# Patient Record
Sex: FEMALE | Race: WHITE
[De-identification: names, ages, dates, MRNs, and addresses within clinical notes are randomized per-mention and may not be internally consistent; named-entity substitution may affect disease eponyms.]

---

## 2017-02-06 ENCOUNTER — MYAURORA ACCOUNT LINK (OUTPATIENT)
Dept: OTHER | Age: 82
End: 2017-02-06

## 2017-02-06 ENCOUNTER — PRIOR ORIGINAL RECORDS (OUTPATIENT)
Dept: OTHER | Age: 82
End: 2017-02-06

## 2017-03-02 ENCOUNTER — HOSPITAL ENCOUNTER (INPATIENT)
Facility: HOSPITAL | Age: 82
LOS: 4 days | Discharge: NURSING, REHAB OR LONG-TERM CARE | DRG: 439 | End: 2017-03-06
Attending: EMERGENCY MEDICINE | Admitting: HOSPITALIST
Payer: MEDICARE

## 2017-03-02 ENCOUNTER — APPOINTMENT (OUTPATIENT)
Dept: CT IMAGING | Facility: HOSPITAL | Age: 82
DRG: 439 | End: 2017-03-02
Attending: EMERGENCY MEDICINE
Payer: MEDICARE

## 2017-03-02 ENCOUNTER — APPOINTMENT (OUTPATIENT)
Dept: INTERVENTIONAL RADIOLOGY/VASCULAR | Facility: HOSPITAL | Age: 82
DRG: 439 | End: 2017-03-02
Attending: PHYSICIAN ASSISTANT
Payer: MEDICARE

## 2017-03-02 ENCOUNTER — APPOINTMENT (OUTPATIENT)
Dept: GENERAL RADIOLOGY | Facility: HOSPITAL | Age: 82
DRG: 439 | End: 2017-03-02
Attending: EMERGENCY MEDICINE
Payer: MEDICARE

## 2017-03-02 DIAGNOSIS — K85.90 ACUTE PANCREATITIS, UNSPECIFIED COMPLICATION STATUS, UNSPECIFIED PANCREATITIS TYPE: Primary | ICD-10-CM

## 2017-03-02 DIAGNOSIS — K81.0 ACUTE CHOLECYSTITIS: ICD-10-CM

## 2017-03-02 PROBLEM — Z91.81 AT RISK FOR FALLING: Status: ACTIVE | Noted: 2017-03-02

## 2017-03-02 LAB
ALBUMIN SERPL-MCNC: 3.3 G/DL (ref 3.5–4.8)
ALP LIVER SERPL-CCNC: 50 U/L (ref 55–142)
ALT SERPL-CCNC: 58 U/L (ref 14–54)
APTT PPP: 30.4 SECONDS (ref 25–34)
AST SERPL-CCNC: 103 U/L (ref 15–41)
ATRIAL RATE: 60 BPM
BASOPHILS # BLD AUTO: 0.02 X10(3) UL (ref 0–0.1)
BASOPHILS NFR BLD AUTO: 0.2 %
BILIRUB SERPL-MCNC: 0.8 MG/DL (ref 0.1–2)
BUN BLD-MCNC: 13 MG/DL (ref 8–20)
CALCIUM BLD-MCNC: 9.3 MG/DL (ref 8.3–10.3)
CHLORIDE: 100 MMOL/L (ref 101–111)
CO2: 25 MMOL/L (ref 22–32)
CREAT BLD-MCNC: 0.58 MG/DL (ref 0.55–1.02)
EOSINOPHIL # BLD AUTO: 0.03 X10(3) UL (ref 0–0.3)
EOSINOPHIL NFR BLD AUTO: 0.2 %
ERYTHROCYTE [DISTWIDTH] IN BLOOD BY AUTOMATED COUNT: 13.9 % (ref 11.5–16)
GLUCOSE BLD-MCNC: 125 MG/DL (ref 70–99)
HCT VFR BLD AUTO: 35.5 % (ref 34–50)
HGB BLD-MCNC: 11.9 G/DL (ref 12–16)
IMMATURE GRANULOCYTE COUNT: 0.05 X10(3) UL (ref 0–1)
IMMATURE GRANULOCYTE RATIO %: 0.4 %
INR BLD: 1.04 (ref 0.89–1.11)
LIPASE: >7500 U/L (ref 73–393)
LYMPHOCYTES # BLD AUTO: 1.03 X10(3) UL (ref 0.9–4)
LYMPHOCYTES NFR BLD AUTO: 7.9 %
M PROTEIN MFR SERPL ELPH: 5.7 G/DL (ref 6.1–8.3)
MCH RBC QN AUTO: 30.5 PG (ref 27–33.2)
MCHC RBC AUTO-ENTMCNC: 33.5 G/DL (ref 31–37)
MCV RBC AUTO: 91 FL (ref 81–100)
MONOCYTES # BLD AUTO: 0.59 X10(3) UL (ref 0.1–0.6)
MONOCYTES NFR BLD AUTO: 4.6 %
NEUTROPHIL ABS PRELIM: 11.24 X10 (3) UL (ref 1.3–6.7)
NEUTROPHILS # BLD AUTO: 11.24 X10(3) UL (ref 1.3–6.7)
NEUTROPHILS NFR BLD AUTO: 86.7 %
P AXIS: 27 DEGREES
P-R INTERVAL: 204 MS
PLATELET # BLD AUTO: 195 10(3)UL (ref 150–450)
POTASSIUM SERPL-SCNC: 3.6 MMOL/L (ref 3.6–5.1)
PRO-BETA NATRIURETIC PEPTIDE: 444 PG/ML (ref ?–450)
PSA SERPL DL<=0.01 NG/ML-MCNC: 13.6 SECONDS (ref 12–14.3)
Q-T INTERVAL: 442 MS
QRS DURATION: 94 MS
QTC CALCULATION (BEZET): 442 MS
R AXIS: -27 DEGREES
RBC # BLD AUTO: 3.9 X10(6)UL (ref 3.8–5.1)
RED CELL DISTRIBUTION WIDTH-SD: 46.4 FL (ref 35.1–46.3)
SODIUM SERPL-SCNC: 135 MMOL/L (ref 136–144)
T AXIS: -8 DEGREES
TROPONIN: <0.046 NG/ML (ref ?–0.05)
VENTRICULAR RATE: 60 BPM
WBC # BLD AUTO: 13 X10(3) UL (ref 4–13)

## 2017-03-02 PROCEDURE — 71275 CT ANGIOGRAPHY CHEST: CPT

## 2017-03-02 PROCEDURE — 99223 1ST HOSP IP/OBS HIGH 75: CPT | Performed by: SURGERY

## 2017-03-02 PROCEDURE — BF131ZZ FLUOROSCOPY OF GALLBLADDER AND BILE DUCTS USING LOW OSMOLAR CONTRAST: ICD-10-PCS | Performed by: RADIOLOGY

## 2017-03-02 PROCEDURE — 0F9430Z DRAINAGE OF GALLBLADDER WITH DRAINAGE DEVICE, PERCUTANEOUS APPROACH: ICD-10-PCS | Performed by: RADIOLOGY

## 2017-03-02 PROCEDURE — 71010 XR CHEST AP PORTABLE  (CPT=71010): CPT

## 2017-03-02 PROCEDURE — 74177 CT ABD & PELVIS W/CONTRAST: CPT

## 2017-03-02 PROCEDURE — 99223 1ST HOSP IP/OBS HIGH 75: CPT | Performed by: INTERNAL MEDICINE

## 2017-03-02 RX ORDER — HYDROMORPHONE HYDROCHLORIDE 1 MG/ML
0.8 INJECTION, SOLUTION INTRAMUSCULAR; INTRAVENOUS; SUBCUTANEOUS EVERY 2 HOUR PRN
Status: DISCONTINUED | OUTPATIENT
Start: 2017-03-02 | End: 2017-03-06

## 2017-03-02 RX ORDER — HYDROMORPHONE HYDROCHLORIDE 1 MG/ML
0.2 INJECTION, SOLUTION INTRAMUSCULAR; INTRAVENOUS; SUBCUTANEOUS EVERY 2 HOUR PRN
Status: DISCONTINUED | OUTPATIENT
Start: 2017-03-02 | End: 2017-03-06

## 2017-03-02 RX ORDER — HYDROMORPHONE HYDROCHLORIDE 1 MG/ML
0.4 INJECTION, SOLUTION INTRAMUSCULAR; INTRAVENOUS; SUBCUTANEOUS EVERY 2 HOUR PRN
Status: DISCONTINUED | OUTPATIENT
Start: 2017-03-02 | End: 2017-03-06

## 2017-03-02 RX ORDER — ASPIRIN 81 MG/1
324 TABLET, CHEWABLE ORAL ONCE
Status: COMPLETED | OUTPATIENT
Start: 2017-03-02 | End: 2017-03-02

## 2017-03-02 RX ORDER — LIDOCAINE HYDROCHLORIDE 10 MG/ML
INJECTION, SOLUTION INFILTRATION; PERINEURAL
Status: COMPLETED
Start: 2017-03-02 | End: 2017-03-02

## 2017-03-02 RX ORDER — SODIUM CHLORIDE 9 MG/ML
INJECTION, SOLUTION INTRAVENOUS ONCE
Status: COMPLETED | OUTPATIENT
Start: 2017-03-02 | End: 2017-03-02

## 2017-03-02 RX ORDER — ONDANSETRON 2 MG/ML
4 INJECTION INTRAMUSCULAR; INTRAVENOUS EVERY 6 HOURS PRN
Status: DISCONTINUED | OUTPATIENT
Start: 2017-03-02 | End: 2017-03-06

## 2017-03-02 RX ORDER — ONDANSETRON 2 MG/ML
4 INJECTION INTRAMUSCULAR; INTRAVENOUS EVERY 4 HOURS PRN
Status: DISCONTINUED | OUTPATIENT
Start: 2017-03-02 | End: 2017-03-02 | Stop reason: ALTCHOICE

## 2017-03-02 RX ORDER — ENOXAPARIN SODIUM 100 MG/ML
40 INJECTION SUBCUTANEOUS DAILY
Status: DISCONTINUED | OUTPATIENT
Start: 2017-03-02 | End: 2017-03-03 | Stop reason: DRUGHIGH

## 2017-03-02 RX ORDER — HYDRALAZINE HYDROCHLORIDE 20 MG/ML
5 INJECTION INTRAMUSCULAR; INTRAVENOUS EVERY 6 HOURS PRN
Status: DISCONTINUED | OUTPATIENT
Start: 2017-03-02 | End: 2017-03-06

## 2017-03-02 RX ORDER — TRAMADOL HYDROCHLORIDE 50 MG/1
50 TABLET ORAL 3 TIMES DAILY
Status: ON HOLD | COMMUNITY
End: 2017-03-06

## 2017-03-02 RX ORDER — MAGNESIUM HYDROXIDE/ALUMINUM HYDROXICE/SIMETHICONE 120; 1200; 1200 MG/30ML; MG/30ML; MG/30ML
30 SUSPENSION ORAL ONCE
Status: COMPLETED | OUTPATIENT
Start: 2017-03-02 | End: 2017-03-02

## 2017-03-02 RX ORDER — SODIUM CHLORIDE, SODIUM LACTATE, POTASSIUM CHLORIDE, CALCIUM CHLORIDE 600; 310; 30; 20 MG/100ML; MG/100ML; MG/100ML; MG/100ML
INJECTION, SOLUTION INTRAVENOUS CONTINUOUS
Status: DISCONTINUED | OUTPATIENT
Start: 2017-03-02 | End: 2017-03-03

## 2017-03-02 RX ORDER — ONDANSETRON 2 MG/ML
4 INJECTION INTRAMUSCULAR; INTRAVENOUS ONCE
Status: COMPLETED | OUTPATIENT
Start: 2017-03-02 | End: 2017-03-02

## 2017-03-02 RX ORDER — HYDROMORPHONE HYDROCHLORIDE 1 MG/ML
0.5 INJECTION, SOLUTION INTRAMUSCULAR; INTRAVENOUS; SUBCUTANEOUS EVERY 30 MIN PRN
Status: ACTIVE | OUTPATIENT
Start: 2017-03-02 | End: 2017-03-02

## 2017-03-02 RX ORDER — SODIUM CHLORIDE 9 MG/ML
INJECTION, SOLUTION INTRAVENOUS CONTINUOUS
Status: DISCONTINUED | OUTPATIENT
Start: 2017-03-02 | End: 2017-03-02

## 2017-03-02 RX ORDER — MIDAZOLAM HYDROCHLORIDE 1 MG/ML
INJECTION INTRAMUSCULAR; INTRAVENOUS
Status: COMPLETED
Start: 2017-03-02 | End: 2017-03-02

## 2017-03-02 RX ORDER — SODIUM CHLORIDE 9 MG/ML
INJECTION, SOLUTION INTRAVENOUS CONTINUOUS
Status: ACTIVE | OUTPATIENT
Start: 2017-03-02 | End: 2017-03-02

## 2017-03-02 NOTE — ED INITIAL ASSESSMENT (HPI)
Pt states she woke up from shoulder pain (d/t arthritis) at 0200 this morning. Pt states she started having chest pain that radiated to her back. Pt also reports feeling nauseous.

## 2017-03-02 NOTE — PROCEDURES
[facility]  Procedure Note    Luz Maria Dong Patient Status:  Inpatient    1920 MRN [id number]   [facility] 5NW-A Attending Venita Patel MD   Hosp Day # 0 PCP Rivka Hemphill MD     Procedure: cholecystostomy    Pre-Procedure Diag

## 2017-03-02 NOTE — PAYOR COMM NOTE
Attending Physician: bAilio John MD    Review Type: ADMISSION   Reviewer: Carolyn WYLIE       Date: March 2, 2017 - 10:59 AM  Payor: Gaby Herrera MEDICARE ADV PPO  Authorization Number: [id number]/A  Admit date: 3/2/2017  6:03 AM   Admitted from Emergency Dept.:  Alexey Payton User    3/2/2017 1047 Given 4 mg Intravenous Allison Schaffer RN      ondansetron HCl [facility]) injection 4 mg     Date Action Dose Route User    3/2/2017 0928 Given 4 mg Intravenous Gio Conklin RN      Piperacillin Sod-Tazobactam So (ZOSYN) 3.375 g i Impression:  Acute pancreatitis,   Acute cholecystitis    NPO  CARDIAC MONITORING   GI AND SURG CONSULT    ML HR

## 2017-03-02 NOTE — CONSULTS
[facility]                       [facility]enter[facility] Consultation-[facility]    Madhuri Brooks Patient Status:  Inpatient    1920 MRN [id number]   [facility] 5NW-A Attending Vahid Mittal MD   Hosp Day # 0 PCP APPENDECTOMY      TONSILLECTOMY          Medications:   [COMPLETED] aspirin chewable tab 324 mg 324 mg Oral Once   [COMPLETED] Alum & Mag Hydroxide-Simeth (MAALOX) oral suspension 30 mL 30 mL Oral Once   [COMPLETED] ondansetron HCl (ZOFRAN) injection 4 mg history of colon cancer or other gastrointestinal malignancies; No family history of ulcer disease, or inflammatory bowel disease;  She reports that her other needed to have her gallbladder removed     ROS:  In addition to the pertinent positives described ascites is clinically apparent; no tympany to percussion    Ext: No peripheral edema or cyanosis    Skin: Warm and dry    Psychiatric: Appropriate mood and congruent affect without obvious depression or anxiety    Labs:   Lab Results  Component Value Date  Patient complains of chest pain that radiates to the back. The pain is severe.  Patient also complains of generalized abdominal pain, and nausea.      CONTRAST USED:  75 cc of Omnipaque 350  CONTRAST WASTED:  0 cc of Omnipaque 350       FINDINGS:  Multipla is noted. Renal collecting systems are not significantly dilated. No intra-abdominal lymphadenopathy.     PELVIS:  There is diverticulosis of the colon without evidence of acute diverticulitis. Large amount of stool the colon is noted.  No pelvic lymphaden 3/02/2017 at 7:02       Approved by: Jalen Tee MD          Impression: 80year old female with a medical history that includes HTN, aortic aneurysm, migraines, GERD, and chronic arthritis (gordon back/shoulder) admitted today with acute epigastric cristiane

## 2017-03-02 NOTE — CONSULTS
[facility]  Report of Consultation    Belkis Person Patient Status:  Inpatient    1920 MRN [id number]   [place] 5NW-A Attending Ryan Salvador MD   Hosp Day # 0 PCP Shari Gonzalez MD     Reason for Consultation:  Abdominal pain healthcare power of . The patient has no smoking history. She drinks alcohol very rarely.     History:  Past Medical History   Diagnosis Date   • Unspecified essential hypertension    • Migraines    • Cardiac anomaly    • Osteoarthritis    • Aneur abdominal pain, constipation, nausea and vomiting  Genitourinary:  Negative for dysuria and hematuria  Hema/Lymph:  Negative for easy bleeding and easy bruising  Integumentary:  Negative for pruritus and rash  Musculoskeletal:  Negative for bone/joint symp (hypertension)     Constipation     Acute pancreatitis     Acute pancreatitis, unspecified complication status, unspecified pancreatitis type     Acute cholecystitis     Aneurysm of aorta (HCC)     At risk for falling    Acute gall stone pancreatitis  Acut elevated lipase and transaminases. There is no evidence pharmacologically or otherwise of an acute coronary event. The patient's pain was precipitated after a meal of grilled cheese. The patient states she has never had pain similar to this.     In view and ambulate. · Further management of medical issues per primary care. ·   · The patient was provided ample opportunity to ask questions. · All of the patient's questions were answered in detail. · The patient voiced understanding of the care plan.

## 2017-03-02 NOTE — H&P
[facility]                                                               History & Physical         Peter Jacobo Patient Status:  Inpatient    1920 MRN [id number]   [facility] 5NW-A Attending Maribel Hernandez MD   [facility] Day # 0 alcohol. Allergies:  No Known Allergies    Home Medications:    Prescriptions prior to admission:  TraMADol HCl 50 MG Oral Tab Take 50 mg by mouth 3 (three) times daily.  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 Associated significant atelectasis in the left lower lobe is noted. A dominant nodule, mass or consolidation is not identified. The enlarged thyroid gland is again noted.   L1 compression deformity is s hernia. 6. There is interval new vertebral plana compression deformity of T3. This is of unknown chronicity. Progression of T10 compression deformity is noted.  T8 and L1 compression deformities are stable.      This critical result was discussed with

## 2017-03-02 NOTE — ED NOTES
Pt back in room from CT. Sts nausea has improved. Pt laying supine. Denies need for pain meds. Family at bedside.

## 2017-03-02 NOTE — ED PROVIDER NOTES
Patient Seen in: [facility] Emergency Department    History   Patient presents with:  Chest Pain Angina (cardiovascular)    Stated Complaint:     HPI    42-year-old female with history of aortic insufficiency, mitral regurg, ascending aortic aneurys MG Oral Tab,  Take 1 tablet by mouth daily as needed. Indications: Generalized Ache       No family history on file.       Smoking Status: Never Smoker                        Review of Systems    Positive for stated complaint:   Other systems are as noted i LIPASE - Abnormal; Notable for the following:     Lipase >7500 (*)     All other components within normal limits   COMP METABOLIC PANEL (14) - Abnormal; Notable for the following:     Glucose 125 (*)     Alkaline Phosphatase 50 (*)      (*)     Al Patient to receive dose of IV Zosyn. Patient will be admitted for further evaluation and treatment. Patient is remained hemodynamically stable throughout ER stay.       Disposition and Plan     Clinical Impression:  Acute pancreatitis, unspecified complic

## 2017-03-03 ENCOUNTER — APPOINTMENT (OUTPATIENT)
Dept: ULTRASOUND IMAGING | Facility: HOSPITAL | Age: 82
DRG: 439 | End: 2017-03-03
Attending: NURSE PRACTITIONER
Payer: MEDICARE

## 2017-03-03 LAB
ALBUMIN SERPL-MCNC: 2.8 G/DL (ref 3.5–4.8)
ALP LIVER SERPL-CCNC: 56 U/L (ref 55–142)
ALT SERPL-CCNC: 297 U/L (ref 14–54)
AST SERPL-CCNC: 180 U/L (ref 15–41)
BASOPHILS # BLD AUTO: 0.01 X10(3) UL (ref 0–0.1)
BASOPHILS NFR BLD AUTO: 0.1 %
BILIRUB SERPL-MCNC: 0.8 MG/DL (ref 0.1–2)
BUN BLD-MCNC: 13 MG/DL (ref 8–20)
CALCIUM BLD-MCNC: 9.4 MG/DL (ref 8.3–10.3)
CHLORIDE: 105 MMOL/L (ref 101–111)
CO2: 27 MMOL/L (ref 22–32)
CREAT BLD-MCNC: 0.51 MG/DL (ref 0.55–1.02)
EOSINOPHIL # BLD AUTO: 0 X10(3) UL (ref 0–0.3)
EOSINOPHIL NFR BLD AUTO: 0 %
ERYTHROCYTE [DISTWIDTH] IN BLOOD BY AUTOMATED COUNT: 14.2 % (ref 11.5–16)
GLUCOSE BLD-MCNC: 125 MG/DL (ref 70–99)
HAV IGM SER QL: 1.9 MG/DL (ref 1.7–3)
HCT VFR BLD AUTO: 35.8 % (ref 34–50)
HGB BLD-MCNC: 12.5 G/DL (ref 12–16)
IMMATURE GRANULOCYTE COUNT: 0.09 X10(3) UL (ref 0–1)
IMMATURE GRANULOCYTE RATIO %: 0.5 %
LYMPHOCYTES # BLD AUTO: 0.65 X10(3) UL (ref 0.9–4)
LYMPHOCYTES NFR BLD AUTO: 3.9 %
M PROTEIN MFR SERPL ELPH: 5.5 G/DL (ref 6.1–8.3)
MCH RBC QN AUTO: 31 PG (ref 27–33.2)
MCHC RBC AUTO-ENTMCNC: 34.9 G/DL (ref 31–37)
MCV RBC AUTO: 88.8 FL (ref 81–100)
MONOCYTES # BLD AUTO: 0.61 X10(3) UL (ref 0.1–0.6)
MONOCYTES NFR BLD AUTO: 3.7 %
NEUTROPHIL ABS PRELIM: 15.16 X10 (3) UL (ref 1.3–6.7)
NEUTROPHILS # BLD AUTO: 15.16 X10(3) UL (ref 1.3–6.7)
NEUTROPHILS NFR BLD AUTO: 91.8 %
PLATELET # BLD AUTO: 170 10(3)UL (ref 150–450)
POTASSIUM SERPL-SCNC: 3.7 MMOL/L (ref 3.6–5.1)
POTASSIUM SERPL-SCNC: 3.7 MMOL/L (ref 3.6–5.1)
RBC # BLD AUTO: 4.03 X10(6)UL (ref 3.8–5.1)
RED CELL DISTRIBUTION WIDTH-SD: 45.9 FL (ref 35.1–46.3)
SODIUM SERPL-SCNC: 140 MMOL/L (ref 136–144)
WBC # BLD AUTO: 16.5 X10(3) UL (ref 4–13)

## 2017-03-03 PROCEDURE — 99232 SBSQ HOSP IP/OBS MODERATE 35: CPT | Performed by: SURGERY

## 2017-03-03 PROCEDURE — 76700 US EXAM ABDOM COMPLETE: CPT | Performed by: NURSE PRACTITIONER

## 2017-03-03 PROCEDURE — 99232 SBSQ HOSP IP/OBS MODERATE 35: CPT | Performed by: INTERNAL MEDICINE

## 2017-03-03 RX ORDER — POTASSIUM CHLORIDE 20 MEQ/1
40 TABLET, EXTENDED RELEASE ORAL ONCE
Status: COMPLETED | OUTPATIENT
Start: 2017-03-03 | End: 2017-03-03

## 2017-03-03 RX ORDER — PANTOPRAZOLE SODIUM 40 MG/1
40 TABLET, DELAYED RELEASE ORAL
Status: DISCONTINUED | OUTPATIENT
Start: 2017-03-04 | End: 2017-03-06

## 2017-03-03 RX ORDER — ENOXAPARIN SODIUM 100 MG/ML
30 INJECTION SUBCUTANEOUS DAILY
Status: DISCONTINUED | OUTPATIENT
Start: 2017-03-03 | End: 2017-03-06

## 2017-03-03 NOTE — OCCUPATIONAL THERAPY NOTE
Attempted to see pt for skilled OT services this date. Pt is currently off the floor for an ultrasound. Will re-attempt as schedule allows.  Rosi Arias, 03/03/2017, 9:08 AM

## 2017-03-03 NOTE — PROGRESS NOTES
[facility]  Progress Note    Luz Maria Dong Patient Status:  Inpatient    1920 MRN [id number]   [facility] 5NW-A Attending Venita Patel MD   Hosp Day # 1 PCP Rivka Hemphill MD     CC:  Abdominal pain    SUBJECTIVE:  Abdominal cristiane 140 03/03/2017   K 3.7 03/03/2017   K 3.7 03/03/2017    03/03/2017   CO2 27.0 03/03/2017    03/03/2017   CA 9.4 03/03/2017   ALB 2.8 03/03/2017   ALKPHO 56 03/03/2017   BILT 0.8 03/03/2017   TP 5.5 03/03/2017    03/03/2017    03/ Common bile duct dilatation measuring 9 mm.  This appears mildly reduced from prior examination.            Dictated by: Torrie Bright MD on 3/03/2017 at 9:27       Meds:     Current Facility-Administered Medications:  Piperacillin Sod-Tazobactam So Renae Butts this      Questions/concerns and Plan of care were discussed with patient and family by bedside.           Flash Gallegos MD  3/3/2017  1:44 PM

## 2017-03-03 NOTE — CM/SW NOTE
03/03/17 1200   CM/SW Referral Data   Referral Source Physician;Social Work (self-referral)   Reason for Referral Discharge planning   Informant Patient; Other  (family)   Pertinent Medical Hx   Primary Care Physician Name [address] [place] [address]   Patient Info   Patient

## 2017-03-03 NOTE — PROGRESS NOTES
Multidisciplinary Discharge Rounds held 3/3/2017. Treatment team members present today include , , Charge Nurse,  Nurse, RT, PT and Pharmacy caring for [employer].      Other care providers present:    Patient Active Problem

## 2017-03-03 NOTE — OCCUPATIONAL THERAPY NOTE
OCCUPATIONAL THERAPY EVALUATION - INPATIENT     Room Number: 530/530-A  Evaluation Date: 3/3/2017  Type of Evaluation: Initial  Presenting Problem: pancreatitis, gall stones, cholecystitis, s/p cholecystostomy on 3/2/17, chest pain and abd pain     Physici go home     OBJECTIVE  Precautions: Drain(s)  Fall Risk: High fall risk    WEIGHT BEARING RESTRICTION  Weight Bearing Restriction: None                PAIN ASSESSMENT  Ratin  Location: right shoulder        COGNITION  Safety Judgement:  decreased aware performed a toilet T/F with min assist and grab bar use to an elevated height with a commode placed over a standard toilet.  Pt performed all aspects of toileting including сергей-care and clothing management with mod assist. Pt performed functional mobility Fair  Frequency (Obs): 5x/week  Number of Visits to Meet Established Goals: 5    ADL Goals  Patient will perform eating: with set up  Patient will perform grooming: with setup  Patient will perform lower body dressing:  with min assist and with adaptive eq

## 2017-03-03 NOTE — CONSULTS
[facility] Dosing Service  Antibiotic Dosing    Carol Moreno is a 80year old female for whom pharmacy is dosing zosyn for treatment of acute biliary pancreatitis and early acute cholecystitis. Allergies: has No Known Allergies.     Vitals: /

## 2017-03-03 NOTE — PROGRESS NOTES
[facility]  Progress Note    Zia Packer Patient Status:  Inpatient    1920 MRN [id number]   [facility] 5NW-A Attending James Dumont MD   Hosp Day # 1 PCP Chary Lorenzo MD     Subjective:   The patient is seen and examined in CREATSERUM 0.51 03/03/2017    03/03/2017   CA 9.4 03/03/2017   ALKPHO 56 03/03/2017    03/03/2017    03/03/2017   BILT 0.8 03/03/2017   ALB 2.8 03/03/2017   TP 5.5 03/03/2017       Lab Results  Component Value Date   MG 1.9 03/03/201

## 2017-03-03 NOTE — PHYSICAL THERAPY NOTE
PHYSICAL THERAPY EVALUATION - INPATIENT     Room Number: 530/530-A  Evaluation Date: 3/3/2017  Type of Evaluation: Initial  Physician Order: PT Eval and Treat    Presenting Problem: Acute pancreatitis  Reason for Therapy: Mobility Dysfunction and Disch Relaxation;Repositioning    COGNITION  · Following Commands:  follows all commands and directions without difficulty  · Awareness of Deficits:  decreased awareness of deficits    RANGE OF MOTION AND STRENGTH ASSESSMENT  Upper extremity ROM and strength are for hand placement. Patient ambulated 10ft to bathroom with OT and use of rolling walker. Patient was able to get back into bed with CGA. Patient responded well to therapy and was encouraged to ambulate in the hallways with a walker and with nursing staff. level: supervision     Goal #2 Patient is able to demonstrate transfers Sit to/from Stand at assistance level: supervision     Goal #3 Patient is able to ambulate 50ft feet with assist device: walker - rolling at assistance level: supervision     Goal #4 P

## 2017-03-03 NOTE — CONSULTS
[facility] Pharmacy Note:  Renal Dose Adjustment    Екатерина Rodríguez has been prescribed enoxaparin (LOVENOX) 40 mg subcutaneously every 24 hours. Pt is 80years old, 44.4 kg, 5'1\". Adjusted clcr for frail body type = 27 mL/min.     Her calculated creatinine bibiana

## 2017-03-04 LAB
ALBUMIN SERPL-MCNC: 2.5 G/DL (ref 3.5–4.8)
ALP LIVER SERPL-CCNC: 52 U/L (ref 55–142)
ALT SERPL-CCNC: 167 U/L (ref 14–54)
AST SERPL-CCNC: 58 U/L (ref 15–41)
BASOPHILS # BLD AUTO: 0.04 X10(3) UL (ref 0–0.1)
BASOPHILS NFR BLD AUTO: 0.3 %
BILIRUB SERPL-MCNC: 0.8 MG/DL (ref 0.1–2)
BUN BLD-MCNC: 17 MG/DL (ref 8–20)
CALCIUM BLD-MCNC: 9.1 MG/DL (ref 8.3–10.3)
CHLORIDE: 102 MMOL/L (ref 101–111)
CO2: 28 MMOL/L (ref 22–32)
CREAT BLD-MCNC: 0.58 MG/DL (ref 0.55–1.02)
EOSINOPHIL # BLD AUTO: 0.01 X10(3) UL (ref 0–0.3)
EOSINOPHIL NFR BLD AUTO: 0.1 %
ERYTHROCYTE [DISTWIDTH] IN BLOOD BY AUTOMATED COUNT: 14.5 % (ref 11.5–16)
GLUCOSE BLD-MCNC: 96 MG/DL (ref 70–99)
HCT VFR BLD AUTO: 31.9 % (ref 34–50)
HGB BLD-MCNC: 11.1 G/DL (ref 12–16)
IMMATURE GRANULOCYTE COUNT: 0.06 X10(3) UL (ref 0–1)
IMMATURE GRANULOCYTE RATIO %: 0.5 %
LIPASE: 178 U/L (ref 73–393)
LYMPHOCYTES # BLD AUTO: 0.91 X10(3) UL (ref 0.9–4)
LYMPHOCYTES NFR BLD AUTO: 6.9 %
M PROTEIN MFR SERPL ELPH: 5.2 G/DL (ref 6.1–8.3)
MCH RBC QN AUTO: 31.1 PG (ref 27–33.2)
MCHC RBC AUTO-ENTMCNC: 34.8 G/DL (ref 31–37)
MCV RBC AUTO: 89.4 FL (ref 81–100)
MONOCYTES # BLD AUTO: 0.33 X10(3) UL (ref 0.1–0.6)
MONOCYTES NFR BLD AUTO: 2.5 %
NEUTROPHIL ABS PRELIM: 11.92 X10 (3) UL (ref 1.3–6.7)
NEUTROPHILS # BLD AUTO: 11.92 X10(3) UL (ref 1.3–6.7)
NEUTROPHILS NFR BLD AUTO: 89.7 %
PLATELET # BLD AUTO: 145 10(3)UL (ref 150–450)
POTASSIUM SERPL-SCNC: 4 MMOL/L (ref 3.6–5.1)
POTASSIUM SERPL-SCNC: 4 MMOL/L (ref 3.6–5.1)
RBC # BLD AUTO: 3.57 X10(6)UL (ref 3.8–5.1)
RED CELL DISTRIBUTION WIDTH-SD: 47.1 FL (ref 35.1–46.3)
SODIUM SERPL-SCNC: 137 MMOL/L (ref 136–144)
WBC # BLD AUTO: 13.3 X10(3) UL (ref 4–13)

## 2017-03-04 PROCEDURE — 99232 SBSQ HOSP IP/OBS MODERATE 35: CPT | Performed by: INTERNAL MEDICINE

## 2017-03-04 NOTE — PROGRESS NOTES
[facility]  Progress Note    Jackie Valente Patient Status:  Inpatient    1920 MRN [id number]   [facility] 5NW-A Attending Alethea Garrett MD   Hosp Day # 2 PCP Gudelia Bernal MD     CC:  Abdominal pain    SUBJECTIVE:  Abdominal cristiane .0 03/04/2017   CREATSERUM 0.58 03/04/2017   BUN 17 03/04/2017    03/04/2017   K 4.0 03/04/2017   K 4.0 03/04/2017    03/04/2017   CO2 28.0 03/04/2017   GLU 96 03/04/2017   CA 9.1 03/04/2017   ALB 2.5 03/04/2017   ALKPHO 52 03/04/2017 cholecystostomy tube which is suboptimally visualized on this examination. #2. Common bile duct dilatation measuring 9 mm.  This appears mildly reduced from prior examination.            Dictated by: Anastasia Martin MD on 3/03/2017 at 9:27       Meds: Clinical progress, MRCP, GI and surgery clearance, MELISA placement  At this point Ms. Albin Sharp  is expected to be discharge to: MELISA per  PTOT, SW to work with family on this      Questions/concerns and Plan of care were discussed with patient       Varela Bong,

## 2017-03-04 NOTE — PROGRESS NOTES
[facility]  Progress Note    Feliciafinn Garcia Patient Status:  Inpatient    1920 MRN [id number]   [facility] 5NW-A Attending Grabiel Melendez MD   Hosp Day # 2 PCP Missy Zuleta MD     Subjective:   The patient feels well today; slight unspecified complication status, unspecified pancreatitis type     Acute cholecystitis     Aneurysm of aorta (HCC)     At risk for falling      Acute biliary pancreatitis with acute cholecystitis. Overall improving.   Care plan discussed with patient an

## 2017-03-04 NOTE — PROGRESS NOTES
Gastroenterology Progress Note  Patient Name: Javier Atwood  Chief Complaint: abdominal pain  S: Feeling better today. Has good appetite.     O: /58 mmHg  Pulse 89  Temp(Src) 98.3 °F (36.8 °C) (Oral)  Resp 18  Ht 154.9 cm (5' 1\")  Wt 96 lb 14.4 oz mildly abnormal, and repeat US after decompression with will tube shows continued dilation of CBD (though mild improvement). # Pancreatitis - Has good BS and appetite has returned, suspect this has resolved.   # Cholelithiasis - Improved symptoms with

## 2017-03-04 NOTE — CM/SW NOTE
MSW spoke to patient and she wants to go to [address] [place] [address] at d/c. MSW sent referral via ecin.

## 2017-03-04 NOTE — PROGRESS NOTES
Gastroenterology Progress Note  Patient Name: Andre Barkley  Chief Complaint: RUQ pain  S: No issues today, feels fine.     O: /48 mmHg  Pulse 73  Temp(Src) 98 °F (36.7 °C) (Oral)  Resp 18  Ht 154.9 cm (5' 1\")  Wt 97 lb 14.2 oz (44.4 kg)  BMI 18.50 Recommendations:     1. Clear liquids, advance as tolerated  2. Continue to trend LFTs  3.  Can get MRCP to exclude biliary obstruction; if there is objective finding such as rising bilirubin, then can proceed with EUS/ERCP    MD Markus Lebron

## 2017-03-04 NOTE — PROGRESS NOTES
03/03/17 1822   Clinical Encounter Type   Visited With Patient;Patient and family together  (Patient's son arrived during  visit)   [address] [facility] [address] Patient wants communion  (Patient explains that she received communion )   Pat

## 2017-03-05 ENCOUNTER — APPOINTMENT (OUTPATIENT)
Dept: MRI IMAGING | Facility: HOSPITAL | Age: 82
DRG: 439 | End: 2017-03-05
Attending: STUDENT IN AN ORGANIZED HEALTH CARE EDUCATION/TRAINING PROGRAM
Payer: MEDICARE

## 2017-03-05 PROCEDURE — 74181 MRI ABDOMEN W/O CONTRAST: CPT

## 2017-03-05 PROCEDURE — 76376 3D RENDER W/INTRP POSTPROCES: CPT

## 2017-03-05 PROCEDURE — 99232 SBSQ HOSP IP/OBS MODERATE 35: CPT | Performed by: INTERNAL MEDICINE

## 2017-03-05 RX ORDER — ACETAMINOPHEN 325 MG/1
650 TABLET ORAL EVERY 6 HOURS PRN
Status: DISCONTINUED | OUTPATIENT
Start: 2017-03-05 | End: 2017-03-05

## 2017-03-05 RX ORDER — AMOXICILLIN AND CLAVULANATE POTASSIUM 875; 125 MG/1; MG/1
1 TABLET, FILM COATED ORAL 2 TIMES DAILY
Qty: 12 TABLET | Refills: 0 | Status: SHIPPED | OUTPATIENT
Start: 2017-03-05 | End: 2017-03-11

## 2017-03-05 RX ORDER — ACETAMINOPHEN 325 MG/1
325 TABLET ORAL EVERY 6 HOURS PRN
Status: DISCONTINUED | OUTPATIENT
Start: 2017-03-05 | End: 2017-03-06

## 2017-03-05 RX ORDER — IBUPROFEN 400 MG/1
400 TABLET ORAL EVERY 8 HOURS PRN
Status: DISCONTINUED | OUTPATIENT
Start: 2017-03-05 | End: 2017-03-06

## 2017-03-05 RX ORDER — IBUPROFEN 400 MG/1
400 TABLET ORAL ONCE
Status: COMPLETED | OUTPATIENT
Start: 2017-03-05 | End: 2017-03-05

## 2017-03-05 NOTE — PHYSICAL THERAPY NOTE
PHYSICAL THERAPY TREATMENT NOTE - INPATIENT    Room Number: 038/347-E     Session: 1   Number of Visits to Meet Established Goals: 5    Presenting Problem: Acute pancreatitis    Problem List  Principal Problem:    Acute pancreatitis, unspecified complicat Climbing 3-5 steps with a railing?: A Lot       AM-PAC Score:  Raw Score: 16   PT Approx Degree of Impairment Score: 54.16%   Standardized Score (AM-PAC Scale): 40.78   CMS Modifier (G-Code): CK    FUNCTIONAL ABILITY STATUS  Gait Assessment   Gait Assistan strengthening, due to [facility] admission for acute pancreatitis. At this time, Pt. presents with decreased balance, impaired strength, difficulty with gait/transfers resulting in downgrade of overall functional mobility.   Due to above deficits, Pt will benefit f

## 2017-03-05 NOTE — PROGRESS NOTES
[facility]  Progress Note    Rhona Randhawa Patient Status:  Inpatient    1920 MRN [id number]   [facility] 5NW-A Attending Rakesh Thibodeaux MD   Hosp Day # 3 PCP Jm Lord MD     Subjective:  Pt mahesh. Is NPO for MRCP.   Hussain Zapata

## 2017-03-05 NOTE — PROGRESS NOTES
[facility]  Progress Note    Neisha Lee Patient Status:  Inpatient    1920 MRN [id number]   [facility] 5NW-A Attending Kashmir Reno MD   Hosp Day # 3 PCP Jarrod Joy MD     CC:  Abdominal pain    SUBJECTIVE:  Abdominal cristiane 11:49.  EDALEXSANDRA , CTA CHEST + CT ABD (W) + CT PEL (W) (CPT=71275/02545), 3/02/2017, 7:58.  EDALEXSANDRA , XR ABDOMEN (1 VIEW) (CPT=74000), 2/21/2014, 19:13.  EDALEXSANDRA , US ABDOMEN COMPLETE (CPT=76700),    2/19/2014, 13:25.      INDICATIONS:  To assess biliary dilat Subcutaneous Daily   Pantoprazole Sodium (PROTONIX) EC tab 40 mg 40 mg Oral QAM AC   HYDROmorphone HCl PF (DILAUDID) 1 MG/ML injection 0.2 mg 0.2 mg Intravenous Q2H PRN   Or      HYDROmorphone HCl PF (DILAUDID) 1 MG/ML injection 0.4 mg 0.4 mg Intravenous Q bedside  Follow up with GI and surgeon as directed and regular PCP Darlene Silverman MD in 1 week, and f/u with cardiology Dr Zbigniew Saleem for pre-op clearance as recommended by surgeon.       Gia Singer MD  3/5/2017

## 2017-03-05 NOTE — CM/SW NOTE
Informed by RN that family wants pt to go to [facility] [phone]. Message left for [facility] re: pt being ready for d/c.  SW noted per [facility] referral that pt is accepted to [facility] pending insurance auth.

## 2017-03-06 VITALS
BODY MASS INDEX: 18.29 KG/M2 | DIASTOLIC BLOOD PRESSURE: 80 MMHG | SYSTOLIC BLOOD PRESSURE: 129 MMHG | OXYGEN SATURATION: 97 % | RESPIRATION RATE: 18 BRPM | TEMPERATURE: 99 F | WEIGHT: 96.88 LBS | HEIGHT: 61 IN | HEART RATE: 92 BPM

## 2017-03-06 LAB
BUN BLD-MCNC: 9 MG/DL (ref 8–20)
CALCIUM BLD-MCNC: 8.5 MG/DL (ref 8.3–10.3)
CHLORIDE: 99 MMOL/L (ref 101–111)
CO2: 29 MMOL/L (ref 22–32)
CREAT BLD-MCNC: 0.52 MG/DL (ref 0.55–1.02)
ERYTHROCYTE [DISTWIDTH] IN BLOOD BY AUTOMATED COUNT: 13.7 % (ref 11.5–16)
GLUCOSE BLD-MCNC: 93 MG/DL (ref 70–99)
HCT VFR BLD AUTO: 33 % (ref 34–50)
HGB BLD-MCNC: 11.1 G/DL (ref 12–16)
MCH RBC QN AUTO: 30.4 PG (ref 27–33.2)
MCHC RBC AUTO-ENTMCNC: 33.6 G/DL (ref 31–37)
MCV RBC AUTO: 90.4 FL (ref 81–100)
PLATELET # BLD AUTO: 194 10(3)UL (ref 150–450)
POTASSIUM SERPL-SCNC: 3.3 MMOL/L (ref 3.6–5.1)
RBC # BLD AUTO: 3.65 X10(6)UL (ref 3.8–5.1)
RED CELL DISTRIBUTION WIDTH-SD: 45.1 FL (ref 35.1–46.3)
SODIUM SERPL-SCNC: 135 MMOL/L (ref 136–144)
WBC # BLD AUTO: 7.8 X10(3) UL (ref 4–13)

## 2017-03-06 PROCEDURE — 99232 SBSQ HOSP IP/OBS MODERATE 35: CPT | Performed by: SURGERY

## 2017-03-06 PROCEDURE — 99239 HOSP IP/OBS DSCHRG MGMT >30: CPT | Performed by: INTERNAL MEDICINE

## 2017-03-06 RX ORDER — TRAMADOL HYDROCHLORIDE 50 MG/1
50 TABLET ORAL 3 TIMES DAILY
Status: DISCONTINUED | OUTPATIENT
Start: 2017-03-06 | End: 2017-03-06

## 2017-03-06 RX ORDER — TRAMADOL HYDROCHLORIDE 50 MG/1
50 TABLET ORAL 3 TIMES DAILY
Qty: 20 TABLET | Refills: 0 | Status: ON HOLD | OUTPATIENT
Start: 2017-03-06 | End: 2017-03-22

## 2017-03-06 RX ORDER — POTASSIUM CHLORIDE 20 MEQ/1
40 TABLET, EXTENDED RELEASE ORAL EVERY 4 HOURS
Status: DISPENSED | OUTPATIENT
Start: 2017-03-06 | End: 2017-03-06

## 2017-03-06 NOTE — PAYOR COMM NOTE
Attending Physician: Francheska Jean MD    3/3    Chief Complaint: RUQ pain  Abd: Rare BS, soft, non-distended; Mildly tender in the RUQ around tube site, less than yesterday.  There is no rebound or guarding  Vitals:  /48 mmHg  Pulse 73  Temp(Src) 98

## 2017-03-06 NOTE — CM/SW NOTE
RANJAN received confirmation from admissions liaison, Adriel Valenzuela at [place]. Pat's that facility has obtained insurance authorization for MELISA. RANJAN met with pt and son bedside to discuss expected discharge today.  Son will provide transport and anticipates 2:30pm departur

## 2017-03-06 NOTE — PAYOR COMM NOTE
Attending Physician: Kashmir Reno MD    3/5    Chief Complaint: cholecystitis  S: feels much better today, better energy and appetite, minimal abdominal pain around drain  Vitals:  /79 mmHg  Pulse 97  Temp(Src) 99.1 °F (37.3 °C) (Oral)  Resp 18  Ht pancreatitis), and MRCP rules out CBD stone.      Labs:       Lab  03/03/17   0648   03/04/17   3493    GLU   125*   96    BUN   13   17    CREATSERUM   0.51*   0.58    CA   9.4   9.1    NA   140   698    K   3.7  3.7   4.0  4.0    CL   105   102    CO2   2

## 2017-03-06 NOTE — PROGRESS NOTES
Multidisciplinary Discharge Rounds held 3/6/2017. Treatment team members present today include , , Charge Nurse,  Nurse, RT, PT and Pharmacy caring for [employer].      Other care providers present:    Patient Active Problem

## 2017-03-06 NOTE — PROGRESS NOTES
[facility]  Progress Note    Charli Medina Patient Status:  Inpatient    1920 MRN [id number]   [facility] 5NW-A Attending Francheska Jean MD   [facility] Day # 4 PCP Antione Luong MD     CC:  Abdominal pain    SUBJECTIVE:  Abdominal cristiane 33.0 03/06/2017   .0 03/06/2017   CREATSERUM 0.52 03/06/2017   BUN 9 03/06/2017    03/06/2017   K 3.3 03/06/2017   CL 99 03/06/2017   CO2 29.0 03/06/2017   GLU 93 03/06/2017   CA 8.5 03/06/2017       Imaging:  US ABDOMEN COMPLETE (CPT=76700) prior examination.            Dictated by: Henrique Mcelroy MD on 3/03/2017 at 9:27       Meds:     Current Facility-Administered Medications:  Potassium Chloride ER (K-DUR M20) CR tab 40 mEq 40 mEq Oral Q4H   TraMADol HCl (ULTRAM) tab 50 mg 50 mg Oral TID surgery per surgeon  4.  Osteoarthritis and chronic right shoulder pain      Quality:  · DVT Prophylaxis: SCDs–subcutaneous Lovenox  · CODE status: DNR per patient and patient's family at bedside  · [place]: no    Estimated date of discharge: possibly today/t

## 2017-03-06 NOTE — PROGRESS NOTES
Gastroenterology Progress Note  Patient Name: Garima Bishop  Chief Complaint: cholecystitis    S: feels much better today, better energy and appetite, minimal abdominal pain around drain  O: /79 mmHg  Pulse 97  Temp(Src) 99.1 °F (37.3 °C) (Oral)  Re dilation of the common bile duct to 1.3 cm.  Percutaneous cholecystostomy tube was placed with improvement in symptoms.  LFTs remain mildly abnormal, and repeat US after decompression with will tube shows continued dilation of CBD (though mild improvement

## 2017-03-06 NOTE — PROGRESS NOTES
NURSING DISCHARGE NOTE    Discharged Nursing home via Wheelchair. Accompanied by Family member and Support staff  Belongings Taken by patient/family. Pt and sons received discharge instructions and education. PIV removed.  Follow up appointments dis

## 2017-03-06 NOTE — PROGRESS NOTES
[facility]  Progress Note    Kentrell Felipe Patient Status:  Inpatient    1920 MRN [id number]   [facility] 5NW-A Attending Yamilex Garcia MD   [facility] Day # 4 PCP Michelle Eckert MD     Subjective:  Feel better overall but complains of aorta (HCC)     At risk for falling      Acute biliary pancreatitis and cholecystitis    Overall improved  S/p IR drain of GB  No CBD obstruction  Optimize pain control  Increase activity.    Transition to po medication  Avoid narcotics  Tramadol and ibupro

## 2017-03-06 NOTE — DISCHARGE SUMMARY
[facility]  Discharge Summary    Hermila Hobson Patient Status:  Inpatient    1920 MRN [id number]   [facility] 5NW-A Attending No att. providers found   Hosp Day # 4 PCP Francisco David MD     Date of Admission: 3/2/2017    Date of lisinopril.  Patient states she takes tramadol for the right shoulder arthritis pain.   Pain aggravated by deep breathing and with palpation    Brief Synopsis:   Initial workup included chest x-ray which showed large stable hiatal hernia, no consolidation, surgeon. Procedures during hospitalization:    Status post IR cholecystostomy tube as recommended by surgery done by interventional radiology Dr. Brody Go MD on 3/02/2017.      Incidental or significant findings and recommendations (brief de cardiac clearance for elective surgery per Dr Orly Valdez, [address] [id number]      GI as directed    [facility], [address] [facility] [address] [facility],[address] [facility]  [phone]    In 1 week          P

## 2017-03-06 NOTE — PLAN OF CARE
Impaired Activities of Daily Living    • Achieve highest/safest level of independence in self care Adequate for Discharge        Impaired Functional Mobility    • Achieve highest/safest level of mobility/gait Adequate for Discharge        PAIN - ADULT    •

## 2017-03-07 ENCOUNTER — SNF VISIT (OUTPATIENT)
Dept: INTERNAL MEDICINE CLINIC | Age: 82
End: 2017-03-07

## 2017-03-07 VITALS
HEART RATE: 89 BPM | RESPIRATION RATE: 18 BRPM | OXYGEN SATURATION: 97 % | TEMPERATURE: 97 F | SYSTOLIC BLOOD PRESSURE: 136 MMHG | DIASTOLIC BLOOD PRESSURE: 79 MMHG

## 2017-03-07 DIAGNOSIS — H04.123 DRY EYES: ICD-10-CM

## 2017-03-07 DIAGNOSIS — K85.90 ACUTE PANCREATITIS, UNSPECIFIED COMPLICATION STATUS, UNSPECIFIED PANCREATITIS TYPE: Primary | ICD-10-CM

## 2017-03-07 DIAGNOSIS — K21.9 GASTROESOPHAGEAL REFLUX DISEASE, ESOPHAGITIS PRESENCE NOT SPECIFIED: ICD-10-CM

## 2017-03-07 DIAGNOSIS — R53.1 WEAKNESS: ICD-10-CM

## 2017-03-07 DIAGNOSIS — K59.00 CONSTIPATION, UNSPECIFIED CONSTIPATION TYPE: ICD-10-CM

## 2017-03-07 DIAGNOSIS — K81.0 ACUTE CHOLECYSTITIS: ICD-10-CM

## 2017-03-07 DIAGNOSIS — I10 ESSENTIAL HYPERTENSION: ICD-10-CM

## 2017-03-07 DIAGNOSIS — M15.9 OSTEOARTHRITIS OF MULTIPLE JOINTS, UNSPECIFIED OSTEOARTHRITIS TYPE: ICD-10-CM

## 2017-03-07 PROCEDURE — 99310 SBSQ NF CARE HIGH MDM 45: CPT | Performed by: NURSE PRACTITIONER

## 2017-03-07 NOTE — PROGRESS NOTES
Luz Maria Dong  : 1920  Age 80year old  female patient is admitted to Facility: [address] [place] [address] for MELISA after hospitalization for acute gallstone pancreatitis, acute cholecystitis.     [facility] Admit date:    3.2.17  Discharge Comment: socially-may be half a glass of beer      ALLERGIES:  No Known Allergies    CODE STATUS:  Full Code    ADVANCED CARE PLANNING TEAM: None      CURRENT MEDICATIONS     Current Outpatient Prescriptions:  TraMADol HCl 50 MG Oral Tab Take 1 tablet (50 distress  LINES, TUBES, DRAINS:  none, cholecystostomy tube Right flank--draining deep green colored drainage in small amount in bag  SKIN: no rashes, no suspicious lesions, pale, warm, dry  WOUND: none.   Foam drsg over thoracic spine--no rash/wound  EYES: 4.0    [employer], notes, lab and imaging results reviewed. Medication reconciliation completed. SEE PLAN BELOW  Acute gallstone pancreatitis/cholecystitis  1. Monitor for recurring sxs/fever  2.  Augmetin 875/125 mg BID until 3.11.17  3

## 2017-03-07 NOTE — CM/SW NOTE
Patient discharged on 03/06/2017 as previously planned.          03/07/17 0900   Discharge disposition   Discharged to: Skilled Nurs   Name of Facillity/Home Care/Hospice [facility]   Patient is Discharged to a [address] [place] [address] Yes   Discharge hoffman

## 2017-03-09 ENCOUNTER — SNF VISIT (OUTPATIENT)
Dept: INTERNAL MEDICINE CLINIC | Age: 82
End: 2017-03-09

## 2017-03-09 VITALS — SYSTOLIC BLOOD PRESSURE: 120 MMHG | DIASTOLIC BLOOD PRESSURE: 60 MMHG | HEART RATE: 72 BPM | RESPIRATION RATE: 18 BRPM

## 2017-03-09 DIAGNOSIS — R53.1 WEAKNESS: ICD-10-CM

## 2017-03-09 DIAGNOSIS — K21.9 GASTROESOPHAGEAL REFLUX DISEASE, ESOPHAGITIS PRESENCE NOT SPECIFIED: ICD-10-CM

## 2017-03-09 DIAGNOSIS — K85.90 ACUTE PANCREATITIS, UNSPECIFIED COMPLICATION STATUS, UNSPECIFIED PANCREATITIS TYPE: Primary | ICD-10-CM

## 2017-03-09 DIAGNOSIS — K81.0 ACUTE CHOLECYSTITIS: ICD-10-CM

## 2017-03-09 PROCEDURE — 99308 SBSQ NF CARE LOW MDM 20: CPT | Performed by: NURSE PRACTITIONER

## 2017-03-10 NOTE — PROGRESS NOTES
Olytriston Morales, [address]/1920, 80year old, female    Chief Complaint:  Patient presents with:   Follow - Up: pancreatitis  Belching       Subjective:  PMH significant for HTN, aortic aneurysm, GERD, chronic constipation, and OA.  In MELISA after hospitalization f cranial nerves intact II-XII, follows commands  PSYCHIATRIC: alert and oriented x 3; affect appropriate    Medications reviewed: Yes    Diagnostics reviewed:  No new results available for review.     Medical decision making:  Acute gallstone pancreatitis an

## 2017-03-14 ENCOUNTER — SNF VISIT (OUTPATIENT)
Dept: INTERNAL MEDICINE CLINIC | Age: 82
End: 2017-03-14

## 2017-03-14 VITALS
SYSTOLIC BLOOD PRESSURE: 127 MMHG | TEMPERATURE: 97 F | OXYGEN SATURATION: 96 % | HEART RATE: 80 BPM | DIASTOLIC BLOOD PRESSURE: 72 MMHG | RESPIRATION RATE: 16 BRPM

## 2017-03-14 DIAGNOSIS — K85.90 ACUTE PANCREATITIS, UNSPECIFIED COMPLICATION STATUS, UNSPECIFIED PANCREATITIS TYPE: Primary | ICD-10-CM

## 2017-03-14 DIAGNOSIS — R53.1 WEAKNESS: ICD-10-CM

## 2017-03-14 DIAGNOSIS — H04.123 DRY EYES: ICD-10-CM

## 2017-03-14 DIAGNOSIS — K21.9 GASTROESOPHAGEAL REFLUX DISEASE, ESOPHAGITIS PRESENCE NOT SPECIFIED: ICD-10-CM

## 2017-03-14 DIAGNOSIS — K81.0 ACUTE CHOLECYSTITIS: ICD-10-CM

## 2017-03-14 PROCEDURE — 99308 SBSQ NF CARE LOW MDM 20: CPT | Performed by: NURSE PRACTITIONER

## 2017-03-14 RX ORDER — IBUPROFEN 400 MG/1
400 TABLET ORAL EVERY 6 HOURS PRN
Status: ON HOLD | COMMUNITY
End: 2017-08-09

## 2017-03-14 NOTE — PROGRESS NOTES
Belkis Person, 61/1920, 80year old, female [facility]     Chief Complaint:  Patient presents with:   Follow - Up: s/p pancreat gall stones with biliary drain        Subjective:    Patient is a 80 year pancreatitis, acute cholecystitis w/ ga follows commands  PSYCHIATRIC: alert and oriented x 3; affect appropriate      Medications reviewed: Yes    Diagnostics reviewed:    3/13  WBC 5.15  Hgb 9.9   Hct 30.7   Plt 362    Glucose 74  BUN 8  Creat 0.44  Bilirubin 0.31  Protein 4.7   Albumin 2.5

## 2017-03-16 ENCOUNTER — OFFICE VISIT (OUTPATIENT)
Dept: SURGERY | Facility: CLINIC | Age: 82
End: 2017-03-16

## 2017-03-16 ENCOUNTER — SNF DISCHARGE (OUTPATIENT)
Dept: INTERNAL MEDICINE CLINIC | Age: 82
End: 2017-03-16

## 2017-03-16 VITALS
SYSTOLIC BLOOD PRESSURE: 153 MMHG | HEART RATE: 68 BPM | TEMPERATURE: 97 F | RESPIRATION RATE: 18 BRPM | DIASTOLIC BLOOD PRESSURE: 77 MMHG | HEIGHT: 61 IN | WEIGHT: 96 LBS | BODY MASS INDEX: 18.12 KG/M2

## 2017-03-16 VITALS
OXYGEN SATURATION: 97 % | DIASTOLIC BLOOD PRESSURE: 67 MMHG | SYSTOLIC BLOOD PRESSURE: 126 MMHG | RESPIRATION RATE: 18 BRPM | HEART RATE: 59 BPM

## 2017-03-16 DIAGNOSIS — R53.1 WEAKNESS: ICD-10-CM

## 2017-03-16 DIAGNOSIS — M15.9 OSTEOARTHRITIS OF MULTIPLE JOINTS, UNSPECIFIED OSTEOARTHRITIS TYPE: ICD-10-CM

## 2017-03-16 DIAGNOSIS — K59.00 CONSTIPATION, UNSPECIFIED CONSTIPATION TYPE: ICD-10-CM

## 2017-03-16 DIAGNOSIS — H04.123 DRY EYES: ICD-10-CM

## 2017-03-16 DIAGNOSIS — K80.10 CALCULUS OF GALLBLADDER WITH CHOLECYSTITIS WITHOUT BILIARY OBSTRUCTION, UNSPECIFIED CHOLECYSTITIS ACUITY: Primary | ICD-10-CM

## 2017-03-16 DIAGNOSIS — K21.9 GASTROESOPHAGEAL REFLUX DISEASE, ESOPHAGITIS PRESENCE NOT SPECIFIED: ICD-10-CM

## 2017-03-16 DIAGNOSIS — K81.0 ACUTE CHOLECYSTITIS: ICD-10-CM

## 2017-03-16 DIAGNOSIS — K85.90 ACUTE PANCREATITIS, UNSPECIFIED COMPLICATION STATUS, UNSPECIFIED PANCREATITIS TYPE: Primary | ICD-10-CM

## 2017-03-16 DIAGNOSIS — I10 ESSENTIAL HYPERTENSION: ICD-10-CM

## 2017-03-16 DIAGNOSIS — K85.10 ACUTE BILIARY PANCREATITIS, UNSPECIFIED COMPLICATION STATUS: ICD-10-CM

## 2017-03-16 PROCEDURE — 99316 NF DSCHRG MGMT 30 MIN+: CPT | Performed by: NURSE PRACTITIONER

## 2017-03-16 PROCEDURE — 99213 OFFICE O/P EST LOW 20 MIN: CPT | Performed by: SURGERY

## 2017-03-16 RX ORDER — FUROSEMIDE 20 MG/1
TABLET ORAL
Status: ON HOLD | COMMUNITY
Start: 2017-03-06 | End: 2017-08-09

## 2017-03-16 NOTE — H&P
New Patient Visit Note       Active Problems      1. Calculus of gallbladder with cholecystitis without biliary obstruction, unspecified cholecystitis acuity    2.  Acute biliary pancreatitis, unspecified complication status        Chief Complaint   Patient Mother      Social History    Marital Status:               Spouse Name:                       Years of Education:                 Number of children:               Social History Main Topics    Smoking Status: Never Smoker                      Alcoh and rectal pain. Genitourinary: Negative for dysuria, urgency, frequency and difficulty urinating. Musculoskeletal: Negative for myalgias and arthralgias. Skin: Negative for color change and rash.    Neurological: Negative for tremors, syncope and wea adenopathy present. Right cervical: No superficial cervical, no deep cervical and no posterior cervical adenopathy present. Left cervical: No superficial cervical, no deep cervical and no posterior cervical adenopathy present.         Right axi incorrect diagnosis, injury to adjacent organs and structures. We also discussed the possibile need for further therapeutic, diagnostic, or surgical intervention.   The patient voiced understanding, and after all questions were answered to the patient's sat

## 2017-03-16 NOTE — PROGRESS NOTES
Jackie Valente, 61/1920, 80year old, female is being discharged from Facility: [place] [address]    Date of Admission:  3.6.17    Date of Discharge:  Anticipated 3.17.17                                 Admitting Diagnoses: thoracic spine--no rash/wound  EYES: PERRLA, EOMI, sclera anicteric, conjunctiva normal; there is no nystagmus, no drainage from eyes; +IOP right eye  HENT: normocephalic; normal nose, no nasal drainage, mucous membranes pink, moist  NECK: supple; FROM; no prn    Weakness  1. Home health CNA/RN/PT/OT eval and tx      HTN  1. Lisinopril 10 mg daily  2. F/U w/ Dr Brooklynn Saab in one wk    Chronic constipation  1.  Colace 100 mg BID prn    Medication Reconciliation Completed:  Yes    Follow Up Visits:  PCP <7 days af

## 2017-03-16 NOTE — H&P
New Patient Visit Note       Active Problems      No diagnosis found. Chief Complaint   Patient presents with:  Gallbladder: New patient- pre op appt-- scheduled for Laparoscopic Cholecystectomy with cholangiogram on 3/22/17 @ [facility].        History of Prese sore throat and trouble swallowing. Respiratory: Negative for apnea, cough, shortness of breath and wheezing. Cardiovascular: Negative for chest pain, palpitations and leg swelling.    Gastrointestinal: Negative for nausea, vomiting, abdominal pain, d

## 2017-03-17 PROBLEM — K85.10 BILIARY ACUTE PANCREATITIS: Status: ACTIVE | Noted: 2017-03-17

## 2017-03-17 PROBLEM — K80.10 CALCULUS OF GALLBLADDER WITH CHOLECYSTITIS WITHOUT BILIARY OBSTRUCTION: Status: ACTIVE | Noted: 2017-03-17

## 2017-03-17 RX ORDER — DOCUSATE SODIUM 100 MG/1
100 CAPSULE, LIQUID FILLED ORAL 2 TIMES DAILY PRN
COMMUNITY

## 2017-03-17 RX ORDER — ACETAMINOPHEN 325 MG/1
650 TABLET ORAL EVERY 4 HOURS PRN
COMMUNITY

## 2017-03-17 RX ORDER — CALCIUM CARBONATE 200(500)MG
1 TABLET,CHEWABLE ORAL EVERY 6 HOURS PRN
COMMUNITY

## 2017-03-17 NOTE — OR NURSING
Pt currently at [facility] be discharged today. Requested current med list from RN taking care of pt.

## 2017-03-20 ENCOUNTER — ANESTHESIA EVENT (OUTPATIENT)
Dept: SURGERY | Facility: HOSPITAL | Age: 82
End: 2017-03-20
Payer: MEDICARE

## 2017-03-22 ENCOUNTER — HOSPITAL ENCOUNTER (OUTPATIENT)
Facility: HOSPITAL | Age: 82
Setting detail: HOSPITAL OUTPATIENT SURGERY
Discharge: HOME | End: 2017-03-22
Attending: SURGERY | Admitting: SURGERY
Payer: MEDICARE

## 2017-03-22 ENCOUNTER — ANESTHESIA (OUTPATIENT)
Dept: SURGERY | Facility: HOSPITAL | Age: 82
End: 2017-03-22
Payer: MEDICARE

## 2017-03-22 ENCOUNTER — SURGERY (OUTPATIENT)
Age: 82
End: 2017-03-22

## 2017-03-22 ENCOUNTER — APPOINTMENT (OUTPATIENT)
Dept: GENERAL RADIOLOGY | Facility: HOSPITAL | Age: 82
End: 2017-03-22
Attending: SURGERY
Payer: MEDICARE

## 2017-03-22 VITALS
OXYGEN SATURATION: 99 % | HEART RATE: 64 BPM | SYSTOLIC BLOOD PRESSURE: 161 MMHG | WEIGHT: 101 LBS | RESPIRATION RATE: 16 BRPM | BODY MASS INDEX: 19.07 KG/M2 | DIASTOLIC BLOOD PRESSURE: 81 MMHG | TEMPERATURE: 98 F | HEIGHT: 61 IN

## 2017-03-22 DIAGNOSIS — K80.10 CALCULUS OF GALLBLADDER WITH CHOLECYSTITIS WITHOUT BILIARY OBSTRUCTION, UNSPECIFIED CHOLECYSTITIS ACUITY: ICD-10-CM

## 2017-03-22 PROCEDURE — 76000 FLUOROSCOPY <1 HR PHYS/QHP: CPT

## 2017-03-22 PROCEDURE — 0FT44ZZ RESECTION OF GALLBLADDER, PERCUTANEOUS ENDOSCOPIC APPROACH: ICD-10-PCS | Performed by: SURGERY

## 2017-03-22 PROCEDURE — BF100ZZ FLUOROSCOPY OF BILE DUCTS USING HIGH OSMOLAR CONTRAST: ICD-10-PCS | Performed by: SURGERY

## 2017-03-22 PROCEDURE — 47563 LAPARO CHOLECYSTECTOMY/GRAPH: CPT | Performed by: SURGERY

## 2017-03-22 RX ORDER — HEPARIN SODIUM 5000 [USP'U]/ML
5000 INJECTION, SOLUTION INTRAVENOUS; SUBCUTANEOUS ONCE
Status: DISCONTINUED | OUTPATIENT
Start: 2017-03-22 | End: 2017-03-22

## 2017-03-22 RX ORDER — TRAMADOL HYDROCHLORIDE 50 MG/1
50 TABLET ORAL ONCE
Status: COMPLETED | OUTPATIENT
Start: 2017-03-22 | End: 2017-03-22

## 2017-03-22 RX ORDER — HYDROMORPHONE HYDROCHLORIDE 1 MG/ML
INJECTION, SOLUTION INTRAMUSCULAR; INTRAVENOUS; SUBCUTANEOUS
Status: COMPLETED
Start: 2017-03-22 | End: 2017-03-22

## 2017-03-22 RX ORDER — HYDROMORPHONE HYDROCHLORIDE 1 MG/ML
0.4 INJECTION, SOLUTION INTRAMUSCULAR; INTRAVENOUS; SUBCUTANEOUS EVERY 5 MIN PRN
Status: DISCONTINUED | OUTPATIENT
Start: 2017-03-22 | End: 2017-03-22

## 2017-03-22 RX ORDER — DOCUSATE SODIUM 100 MG/1
100 CAPSULE, LIQUID FILLED ORAL 3 TIMES DAILY
Qty: 90 CAPSULE | Refills: 0 | Status: SHIPPED | OUTPATIENT
Start: 2017-03-22 | End: 2017-08-14

## 2017-03-22 RX ORDER — HYDROCODONE BITARTRATE AND ACETAMINOPHEN 5; 325 MG/1; MG/1
2 TABLET ORAL AS NEEDED
Status: DISCONTINUED | OUTPATIENT
Start: 2017-03-22 | End: 2017-03-22

## 2017-03-22 RX ORDER — BUPIVACAINE HYDROCHLORIDE AND EPINEPHRINE 5; 5 MG/ML; UG/ML
INJECTION, SOLUTION EPIDURAL; INTRACAUDAL; PERINEURAL AS NEEDED
Status: DISCONTINUED | OUTPATIENT
Start: 2017-03-22 | End: 2017-03-22 | Stop reason: HOSPADM

## 2017-03-22 RX ORDER — MEPERIDINE HYDROCHLORIDE 25 MG/ML
12.5 INJECTION INTRAMUSCULAR; INTRAVENOUS; SUBCUTANEOUS AS NEEDED
Status: DISCONTINUED | OUTPATIENT
Start: 2017-03-22 | End: 2017-03-22

## 2017-03-22 RX ORDER — NALOXONE HYDROCHLORIDE 0.4 MG/ML
80 INJECTION, SOLUTION INTRAMUSCULAR; INTRAVENOUS; SUBCUTANEOUS AS NEEDED
Status: DISCONTINUED | OUTPATIENT
Start: 2017-03-22 | End: 2017-03-22

## 2017-03-22 RX ORDER — ONDANSETRON 2 MG/ML
INJECTION INTRAMUSCULAR; INTRAVENOUS
Status: COMPLETED
Start: 2017-03-22 | End: 2017-03-22

## 2017-03-22 RX ORDER — HEPARIN SODIUM 5000 [USP'U]/ML
5000 INJECTION, SOLUTION INTRAVENOUS; SUBCUTANEOUS ONCE
Status: COMPLETED | OUTPATIENT
Start: 2017-03-22 | End: 2017-03-22

## 2017-03-22 RX ORDER — ONDANSETRON 2 MG/ML
4 INJECTION INTRAMUSCULAR; INTRAVENOUS AS NEEDED
Status: DISCONTINUED | OUTPATIENT
Start: 2017-03-22 | End: 2017-03-22

## 2017-03-22 RX ORDER — TRAMADOL HYDROCHLORIDE 50 MG/1
50 TABLET ORAL 3 TIMES DAILY
Qty: 20 TABLET | Refills: 0 | Status: ON HOLD | OUTPATIENT
Start: 2017-03-22 | End: 2017-08-09

## 2017-03-22 RX ORDER — DEXAMETHASONE SODIUM PHOSPHATE 4 MG/ML
4 VIAL (ML) INJECTION AS NEEDED
Status: DISCONTINUED | OUTPATIENT
Start: 2017-03-22 | End: 2017-03-22

## 2017-03-22 RX ORDER — HYDROCODONE BITARTRATE AND ACETAMINOPHEN 5; 325 MG/1; MG/1
1 TABLET ORAL AS NEEDED
Status: DISCONTINUED | OUTPATIENT
Start: 2017-03-22 | End: 2017-03-22

## 2017-03-22 RX ORDER — HEPARIN SODIUM 5000 [USP'U]/ML
INJECTION, SOLUTION INTRAVENOUS; SUBCUTANEOUS
Status: COMPLETED
Start: 2017-03-22 | End: 2017-03-22

## 2017-03-22 RX ORDER — SODIUM CHLORIDE, SODIUM LACTATE, POTASSIUM CHLORIDE, CALCIUM CHLORIDE 600; 310; 30; 20 MG/100ML; MG/100ML; MG/100ML; MG/100ML
INJECTION, SOLUTION INTRAVENOUS CONTINUOUS
Status: DISCONTINUED | OUTPATIENT
Start: 2017-03-22 | End: 2017-03-22

## 2017-03-22 RX ORDER — MIDAZOLAM HYDROCHLORIDE 1 MG/ML
1 INJECTION INTRAMUSCULAR; INTRAVENOUS EVERY 5 MIN PRN
Status: DISCONTINUED | OUTPATIENT
Start: 2017-03-22 | End: 2017-03-22

## 2017-03-22 RX ORDER — DEXAMETHASONE SODIUM PHOSPHATE 4 MG/ML
VIAL (ML) INJECTION
Status: COMPLETED
Start: 2017-03-22 | End: 2017-03-22

## 2017-03-22 NOTE — PROGRESS NOTES
Patient and son verbalized that patient was ready for discharge and patient wanted to get dressed to go home. Discharge instructions given, and patient and son verbalized understanding.

## 2017-03-22 NOTE — OR PREOP
Pt and family updated about delay every half hour. Pt repositioned and taken to the bathroom x 4. Pt stating she has a sore back repositioned warm blanked given.  Questions answered comfort care given as needed

## 2017-03-22 NOTE — H&P (VIEW-ONLY)
New Patient Visit Note       Active Problems      1. Calculus of gallbladder with cholecystitis without biliary obstruction, unspecified cholecystitis acuity    2.  Acute biliary pancreatitis, unspecified complication status        Chief Complaint   Patient • Other[other] [OTHER] Mother      Social History    Marital Status:               Spouse Name:                       Years of Education:                 Number of children:               Social History Main Topics    Smoking Status: Never Smoker Gastrointestinal: Negative for nausea, vomiting, abdominal pain, diarrhea, constipation, blood in stool, abdominal distention, anal bleeding and rectal pain. Genitourinary: Negative for dysuria, urgency, frequency and difficulty urinating.    Musculoskele Head (right side): No submental, no submandibular, no preauricular, no posterior auricular and no occipital adenopathy present.         Head (left side): No submental, no submandibular, no preauricular, no posterior auricular and no occipital adenopath · The сергей-operative care plan was discussed with the patient, who voices understanding. Activity and lifting recommendations were discussed in length. ·   · The risks, benefits, and alternatives to the procedure were explained to the patient.   The risk

## 2017-03-22 NOTE — ANESTHESIA POSTPROCEDURE EVALUATION
Buddy Dia 373 Patient Status:  Hospital Outpatient Surgery   Age/Gender 80year old female MRN [id number]   Location [facility] Attending Natalio Saab MD   Hosp Day # 0 PCP Elenora Flake, MD Cristela Simmonds

## 2017-03-22 NOTE — OPERATIVE REPORT
OPERATIVE REPORT   PREOPERATIVE DIAGNOSIS: Cholecystitis and cholelithiasis. POSTOPERATIVE DIAGNOSIS: Chronic Cholecystitis and cholelithiasis.    PROCEDURE PERFORMED: Laparoscopic cholecystectomy with intraoperative cholangiogram.   ASSISTANT: Ms Palma Price, surgical assistant was absolutely essential to the proper conduct of this case, particularly in the performance of the cholangiogram, as well as the careful dissection necessary in and around the hilum of the gallbladder.    DESCRIPTION OF PROCEDURE: The pa divided by Endoshears. Next, one clip was placed on the cystic artery on the specimen side, three towards the patient side, and the cystic artery was divided with the Endoshears.  Next, the gallbladder was elevated from the gallbladder fossa using electroca

## 2017-03-22 NOTE — ANESTHESIA PREPROCEDURE EVALUATION
PRE-OP EVALUATION    Patient Name: Peter Jacobo    Pre-op Diagnosis: Calculus of gallbladder with cholecystitis without biliary obstruction, unspecified cholecystitis acuity [K80.00]    Procedure(s):  LAPAROSCOPIC CHOLECYSTECTOMY WITH CHOLANGIOGRAM     S There was no diagnostic evidence for regional      wall motion abnormalities. Doppler parameters are consistent with      abnormal left ventricular relaxation - grade 1 diastolic dysfunction. 2. Aortic valve: Thickening, consistent with sclerosis.  Mild t distance: > 6 cm  Neck ROM: full Cardiovascular      Rhythm: regular  Rate: normal     Dental             Pulmonary      Breath sounds clear to auscultation bilaterally.                Other findings            ASA: 2   Plan: general  NPO status verified an

## 2017-03-22 NOTE — INTERVAL H&P NOTE
Pre-op Diagnosis: Calculus of gallbladder with cholecystitis without biliary obstruction, unspecified cholecystitis acuity [K80.00]    The above referenced H&P was reviewed by Thesamm Lopes MD on 3/22/2017, the patient was examined and no significant c

## 2017-03-22 NOTE — BRIEF OP NOTE
[facility]  Brief Op Note     Madhuri Brooks Location: OR   [facility] [id number] MRN [id number]   Admission Date 3/22/2017 Operation Date 3/22/2017   Attending Physician Natalia Kohli MD Operating Physician Nirmal Ross MD       Pre-Operativ

## 2017-03-23 NOTE — OR NURSING
Spoke with son, Kurtis Urbina in regards to his comments back from post-op text. Clarified situation that occurred in post-op. Son had no further complaints.

## 2017-03-31 ENCOUNTER — TELEPHONE (OUTPATIENT)
Dept: SURGERY | Facility: CLINIC | Age: 82
End: 2017-03-31

## 2017-03-31 NOTE — TELEPHONE ENCOUNTER
OT from home health called to let Dr know they are reorganizing the pt's care and moving pt's appt from this week to next week. She will send order for Dr. Swift  to sign.

## 2017-04-05 ENCOUNTER — OFFICE VISIT (OUTPATIENT)
Dept: SURGERY | Facility: CLINIC | Age: 82
End: 2017-04-05

## 2017-04-05 VITALS — TEMPERATURE: 98 F | HEIGHT: 61 IN | WEIGHT: 101 LBS | BODY MASS INDEX: 19.07 KG/M2

## 2017-04-05 DIAGNOSIS — K80.00 CALCULUS OF GALLBLADDER WITH ACUTE CHOLECYSTITIS WITHOUT OBSTRUCTION: Primary | ICD-10-CM

## 2017-04-05 PROCEDURE — 99024 POSTOP FOLLOW-UP VISIT: CPT | Performed by: SURGERY

## 2017-04-05 NOTE — PROGRESS NOTES
Post Operative Visit Note       Active Problems  1.  Calculus of gallbladder with acute cholecystitis without obstruction         Chief Complaint   Patient presents with:  Post-Op: 1st post op visit---Laparascopic Cholecsytectomy with Intraoperative Cholang Smoker                      Alcohol Use: Yes                Comment: socially-may be half a glass of beer    Drug Use: No                 Current Outpatient Prescriptions:  TraMADol HCl 50 MG Oral Tab Take 1 tablet (50 mg total) by mouth 3 (three) times da urinating. Musculoskeletal: Negative for myalgias and arthralgias. Skin: Negative for color change and rash. Neurological: Negative for tremors, syncope and weakness. Hematological: Negative for adenopathy. Does not bruise/bleed easily.    Psychiatr

## 2017-07-30 ENCOUNTER — APPOINTMENT (OUTPATIENT)
Dept: GENERAL RADIOLOGY | Facility: HOSPITAL | Age: 82
End: 2017-07-30
Payer: MEDICARE

## 2017-07-30 ENCOUNTER — HOSPITAL ENCOUNTER (EMERGENCY)
Facility: HOSPITAL | Age: 82
Discharge: HOME | End: 2017-07-30
Attending: EMERGENCY MEDICINE
Payer: MEDICARE

## 2017-07-30 VITALS
OXYGEN SATURATION: 95 % | SYSTOLIC BLOOD PRESSURE: 150 MMHG | TEMPERATURE: 97 F | DIASTOLIC BLOOD PRESSURE: 91 MMHG | HEART RATE: 95 BPM | RESPIRATION RATE: 18 BRPM | WEIGHT: 101 LBS | BODY MASS INDEX: 19 KG/M2

## 2017-07-30 DIAGNOSIS — N30.00 ACUTE CYSTITIS WITHOUT HEMATURIA: ICD-10-CM

## 2017-07-30 DIAGNOSIS — S52.615A CLOSED NONDISPLACED FRACTURE OF STYLOID PROCESS OF LEFT ULNA, INITIAL ENCOUNTER: Primary | ICD-10-CM

## 2017-07-30 LAB
ALBUMIN SERPL-MCNC: 3.3 G/DL (ref 3.5–4.8)
ALP LIVER SERPL-CCNC: 57 U/L (ref 55–142)
ALT SERPL-CCNC: 16 U/L (ref 14–54)
AST SERPL-CCNC: 20 U/L (ref 15–41)
BASOPHILS # BLD AUTO: 0.03 X10(3) UL (ref 0–0.1)
BASOPHILS NFR BLD AUTO: 0.2 %
BILIRUB SERPL-MCNC: 0.5 MG/DL (ref 0.1–2)
BILIRUB UR QL STRIP.AUTO: NEGATIVE
BUN BLD-MCNC: 10 MG/DL (ref 8–20)
CALCIUM BLD-MCNC: 8.3 MG/DL (ref 8.3–10.3)
CHLORIDE: 99 MMOL/L (ref 101–111)
CK: 114 IU/L (ref 26–192)
CLARITY UR REFRACT.AUTO: CLEAR
CO2: 28 MMOL/L (ref 22–32)
COLOR UR AUTO: YELLOW
CREAT BLD-MCNC: 0.51 MG/DL (ref 0.55–1.02)
EOSINOPHIL # BLD AUTO: 0 X10(3) UL (ref 0–0.3)
EOSINOPHIL NFR BLD AUTO: 0 %
ERYTHROCYTE [DISTWIDTH] IN BLOOD BY AUTOMATED COUNT: 13.8 % (ref 11.5–16)
GLUCOSE BLD-MCNC: 116 MG/DL (ref 65–99)
GLUCOSE BLD-MCNC: 119 MG/DL (ref 70–99)
GLUCOSE UR STRIP.AUTO-MCNC: NEGATIVE MG/DL
HCT VFR BLD AUTO: 34.2 % (ref 34–50)
HGB BLD-MCNC: 11.6 G/DL (ref 12–16)
IMMATURE GRANULOCYTE COUNT: 0.04 X10(3) UL (ref 0–1)
IMMATURE GRANULOCYTE RATIO %: 0.3 %
KETONES UR STRIP.AUTO-MCNC: NEGATIVE MG/DL
LYMPHOCYTES # BLD AUTO: 0.77 X10(3) UL (ref 0.9–4)
LYMPHOCYTES NFR BLD AUTO: 6.3 %
M PROTEIN MFR SERPL ELPH: 6.1 G/DL (ref 6.1–8.3)
MCH RBC QN AUTO: 29.1 PG (ref 27–33.2)
MCHC RBC AUTO-ENTMCNC: 33.9 G/DL (ref 31–37)
MCV RBC AUTO: 85.7 FL (ref 81–100)
MONOCYTES # BLD AUTO: 0.37 X10(3) UL (ref 0.1–0.6)
MONOCYTES NFR BLD AUTO: 3 %
NEUTROPHIL ABS PRELIM: 10.93 X10 (3) UL (ref 1.3–6.7)
NEUTROPHILS # BLD AUTO: 10.93 X10(3) UL (ref 1.3–6.7)
NEUTROPHILS NFR BLD AUTO: 90.2 %
NITRITE UR QL STRIP.AUTO: POSITIVE
PH UR STRIP.AUTO: 8 [PH] (ref 4.5–8)
PLATELET # BLD AUTO: 197 10(3)UL (ref 150–450)
POTASSIUM SERPL-SCNC: 3.5 MMOL/L (ref 3.6–5.1)
PROT UR STRIP.AUTO-MCNC: NEGATIVE MG/DL
RBC # BLD AUTO: 3.99 X10(6)UL (ref 3.8–5.1)
RED CELL DISTRIBUTION WIDTH-SD: 43.3 FL (ref 35.1–46.3)
SODIUM SERPL-SCNC: 135 MMOL/L (ref 136–144)
SP GR UR STRIP.AUTO: 1 (ref 1–1.03)
TSI SER-ACNC: 0.51 MIU/ML (ref 0.35–5.5)
UROBILINOGEN UR STRIP.AUTO-MCNC: <2 MG/DL
WBC # BLD AUTO: 12.1 X10(3) UL (ref 4–13)

## 2017-07-30 PROCEDURE — 84443 ASSAY THYROID STIM HORMONE: CPT | Performed by: EMERGENCY MEDICINE

## 2017-07-30 PROCEDURE — 82962 GLUCOSE BLOOD TEST: CPT

## 2017-07-30 PROCEDURE — 96360 HYDRATION IV INFUSION INIT: CPT | Performed by: EMERGENCY MEDICINE

## 2017-07-30 PROCEDURE — 87186 SC STD MICRODIL/AGAR DIL: CPT | Performed by: EMERGENCY MEDICINE

## 2017-07-30 PROCEDURE — 93005 ELECTROCARDIOGRAM TRACING: CPT

## 2017-07-30 PROCEDURE — 93010 ELECTROCARDIOGRAM REPORT: CPT | Performed by: EMERGENCY MEDICINE

## 2017-07-30 PROCEDURE — 85025 COMPLETE CBC W/AUTO DIFF WBC: CPT | Performed by: EMERGENCY MEDICINE

## 2017-07-30 PROCEDURE — 87184 SC STD DISK METHOD PER PLATE: CPT | Performed by: EMERGENCY MEDICINE

## 2017-07-30 PROCEDURE — 81001 URINALYSIS AUTO W/SCOPE: CPT | Performed by: EMERGENCY MEDICINE

## 2017-07-30 PROCEDURE — 87086 URINE CULTURE/COLONY COUNT: CPT | Performed by: EMERGENCY MEDICINE

## 2017-07-30 PROCEDURE — 73110 X-RAY EXAM OF WRIST: CPT | Performed by: EMERGENCY MEDICINE

## 2017-07-30 PROCEDURE — 99285 EMERGENCY DEPT VISIT HI MDM: CPT | Performed by: EMERGENCY MEDICINE

## 2017-07-30 PROCEDURE — 80053 COMPREHEN METABOLIC PANEL: CPT | Performed by: EMERGENCY MEDICINE

## 2017-07-30 PROCEDURE — 87088 URINE BACTERIA CULTURE: CPT | Performed by: EMERGENCY MEDICINE

## 2017-07-30 PROCEDURE — 96361 HYDRATE IV INFUSION ADD-ON: CPT | Performed by: EMERGENCY MEDICINE

## 2017-07-30 PROCEDURE — 82550 ASSAY OF CK (CPK): CPT | Performed by: EMERGENCY MEDICINE

## 2017-07-30 RX ORDER — SULFAMETHOXAZOLE AND TRIMETHOPRIM 800; 160 MG/1; MG/1
1 TABLET ORAL ONCE
Status: COMPLETED | OUTPATIENT
Start: 2017-07-30 | End: 2017-07-30

## 2017-07-30 RX ORDER — SULFAMETHOXAZOLE AND TRIMETHOPRIM 800; 160 MG/1; MG/1
1 TABLET ORAL 2 TIMES DAILY
Qty: 20 TABLET | Refills: 0 | Status: SHIPPED | OUTPATIENT
Start: 2017-07-30 | End: 2017-07-30

## 2017-07-30 RX ORDER — SULFAMETHOXAZOLE AND TRIMETHOPRIM 800; 160 MG/1; MG/1
1 TABLET ORAL 2 TIMES DAILY
Qty: 20 TABLET | Refills: 0 | Status: ON HOLD | OUTPATIENT
Start: 2017-07-30 | End: 2017-08-09

## 2017-07-30 RX ORDER — SODIUM CHLORIDE 9 MG/ML
INJECTION, SOLUTION INTRAVENOUS ONCE
Status: COMPLETED | OUTPATIENT
Start: 2017-07-30 | End: 2017-07-30

## 2017-07-30 RX ORDER — TRAMADOL HYDROCHLORIDE 50 MG/1
50 TABLET ORAL ONCE
Status: COMPLETED | OUTPATIENT
Start: 2017-07-30 | End: 2017-07-30

## 2017-07-30 NOTE — ED INITIAL ASSESSMENT (HPI)
Pt presents to ER status post fall off toilet this morning at about 0500. Pt does not remember fall. pts son states that pt may have been on floor about 3hrs. Pt c/o left wrist pain. No other complaints. Pt is a&ox3.  Bruise noted to left wrist. Skin warm a

## 2017-07-30 NOTE — ED PROVIDER NOTES
Patient Seen in: [facility] Emergency Department    History   Patient presents with:  Trauma (cardiovascular, musculoskeletal)    Stated Complaint: fall    HPI    80year-old woman who was fine yesterday and then this morning likely fell going to th (six) hours as needed for Pain.        Family History   Problem Relation Age of Onset   • Other[other] [OTHER] Mother        Smoking status: Never Smoker                                                              Smokeless tobacco: Never Used Mucous Urine 1+ (*)     All other components within normal limits   POCT GLUCOSE - Abnormal; Notable for the following:     POC Glucose 116 (*)     All other components within normal limits   CBC W/ DIFFERENTIAL - Abnormal; Notable for the following:     H ============================================================  ED Course  ------------------------------------------------------------  MDM       Patient given Bactrim for her UTI.   She was given tramadol, which she usually takes at home for pain contro

## 2017-07-31 LAB
ATRIAL RATE: 92 BPM
P AXIS: 251 DEGREES
P-R INTERVAL: 176 MS
Q-T INTERVAL: 372 MS
QRS DURATION: 88 MS
QTC CALCULATION (BEZET): 460 MS
R AXIS: -54 DEGREES
T AXIS: -24 DEGREES
VENTRICULAR RATE: 92 BPM

## 2017-08-05 ENCOUNTER — APPOINTMENT (OUTPATIENT)
Dept: GENERAL RADIOLOGY | Facility: HOSPITAL | Age: 82
DRG: 683 | End: 2017-08-05
Attending: EMERGENCY MEDICINE
Payer: MEDICARE

## 2017-08-05 ENCOUNTER — HOSPITAL ENCOUNTER (INPATIENT)
Facility: HOSPITAL | Age: 82
LOS: 3 days | Discharge: NURSING, REHAB OR LONG-TERM CARE | DRG: 683 | End: 2017-08-09
Attending: EMERGENCY MEDICINE | Admitting: HOSPITALIST
Payer: MEDICARE

## 2017-08-05 DIAGNOSIS — E87.6 HYPOKALEMIA: ICD-10-CM

## 2017-08-05 DIAGNOSIS — R53.1 WEAKNESS GENERALIZED: ICD-10-CM

## 2017-08-05 DIAGNOSIS — E87.1 HYPONATREMIA: ICD-10-CM

## 2017-08-05 DIAGNOSIS — E86.0 DEHYDRATION: Primary | ICD-10-CM

## 2017-08-05 LAB
ALBUMIN SERPL-MCNC: 3.2 G/DL (ref 3.5–4.8)
ALP LIVER SERPL-CCNC: 73 U/L (ref 55–142)
ALT SERPL-CCNC: 19 U/L (ref 14–54)
AST SERPL-CCNC: 19 U/L (ref 15–41)
ATRIAL RATE: 102 BPM
BASOPHILS # BLD AUTO: 0.03 X10(3) UL (ref 0–0.1)
BASOPHILS NFR BLD AUTO: 0.2 %
BILIRUB SERPL-MCNC: 0.7 MG/DL (ref 0.1–2)
BILIRUB UR QL STRIP.AUTO: NEGATIVE
BUN BLD-MCNC: 12 MG/DL (ref 8–20)
CALCIUM BLD-MCNC: 9.2 MG/DL (ref 8.3–10.3)
CHLORIDE: 83 MMOL/L (ref 101–111)
CLARITY UR REFRACT.AUTO: CLEAR
CO2: 30 MMOL/L (ref 22–32)
COLOR UR AUTO: YELLOW
CREAT BLD-MCNC: 0.48 MG/DL (ref 0.55–1.02)
EOSINOPHIL # BLD AUTO: 0.02 X10(3) UL (ref 0–0.3)
EOSINOPHIL NFR BLD AUTO: 0.1 %
ERYTHROCYTE [DISTWIDTH] IN BLOOD BY AUTOMATED COUNT: 13.2 % (ref 11.5–16)
GLUCOSE BLD-MCNC: 113 MG/DL (ref 70–99)
GLUCOSE UR STRIP.AUTO-MCNC: NEGATIVE MG/DL
HAV IGM SER QL: 1.4 MG/DL (ref 1.7–3)
HCT VFR BLD AUTO: 34.3 % (ref 34–50)
HGB BLD-MCNC: 12.1 G/DL (ref 12–16)
IMMATURE GRANULOCYTE COUNT: 0.07 X10(3) UL (ref 0–1)
IMMATURE GRANULOCYTE RATIO %: 0.5 %
KETONES UR STRIP.AUTO-MCNC: NEGATIVE MG/DL
LEUKOCYTE ESTERASE UR QL STRIP.AUTO: NEGATIVE
LYMPHOCYTES # BLD AUTO: 1.93 X10(3) UL (ref 0.9–4)
LYMPHOCYTES NFR BLD AUTO: 13.9 %
M PROTEIN MFR SERPL ELPH: 6.5 G/DL (ref 6.1–8.3)
MCH RBC QN AUTO: 29.4 PG (ref 27–33.2)
MCHC RBC AUTO-ENTMCNC: 35.3 G/DL (ref 31–37)
MCV RBC AUTO: 83.5 FL (ref 81–100)
MONOCYTES # BLD AUTO: 1.29 X10(3) UL (ref 0.1–0.6)
MONOCYTES NFR BLD AUTO: 9.3 %
NEUTROPHIL ABS PRELIM: 10.51 X10 (3) UL (ref 1.3–6.7)
NEUTROPHILS # BLD AUTO: 10.51 X10(3) UL (ref 1.3–6.7)
NEUTROPHILS NFR BLD AUTO: 76 %
NITRITE UR QL STRIP.AUTO: NEGATIVE
P-R INTERVAL: 124 MS
PH UR STRIP.AUTO: 6 [PH] (ref 4.5–8)
PLATELET # BLD AUTO: 304 10(3)UL (ref 150–450)
POTASSIUM SERPL-SCNC: 2.9 MMOL/L (ref 3.6–5.1)
POTASSIUM SERPL-SCNC: 3.3 MMOL/L (ref 3.6–5.1)
PROT UR STRIP.AUTO-MCNC: NEGATIVE MG/DL
Q-T INTERVAL: 364 MS
QRS DURATION: 80 MS
QTC CALCULATION (BEZET): 474 MS
R AXIS: -28 DEGREES
RBC # BLD AUTO: 4.11 X10(6)UL (ref 3.8–5.1)
RED CELL DISTRIBUTION WIDTH-SD: 40 FL (ref 35.1–46.3)
SODIUM SERPL-SCNC: 124 MMOL/L (ref 136–144)
SP GR UR STRIP.AUTO: 1.01 (ref 1–1.03)
T AXIS: -19 DEGREES
TROPONIN: <0.046 NG/ML (ref ?–0.05)
TSI SER-ACNC: 1.77 MIU/ML (ref 0.35–5.5)
UROBILINOGEN UR STRIP.AUTO-MCNC: <2 MG/DL
VENTRICULAR RATE: 102 BPM
WBC # BLD AUTO: 13.9 X10(3) UL (ref 4–13)

## 2017-08-05 PROCEDURE — 71010 XR CHEST AP PORTABLE  (CPT=71010): CPT | Performed by: EMERGENCY MEDICINE

## 2017-08-05 PROCEDURE — 99223 1ST HOSP IP/OBS HIGH 75: CPT | Performed by: HOSPITALIST

## 2017-08-05 RX ORDER — IBUPROFEN 400 MG/1
600 TABLET ORAL EVERY 4 HOURS PRN
Status: DISCONTINUED | OUTPATIENT
Start: 2017-08-05 | End: 2017-08-09

## 2017-08-05 RX ORDER — DOCUSATE SODIUM 100 MG/1
100 CAPSULE, LIQUID FILLED ORAL 2 TIMES DAILY
Status: DISCONTINUED | OUTPATIENT
Start: 2017-08-05 | End: 2017-08-09

## 2017-08-05 RX ORDER — ACETAMINOPHEN 325 MG/1
650 TABLET ORAL EVERY 6 HOURS PRN
Status: DISCONTINUED | OUTPATIENT
Start: 2017-08-05 | End: 2017-08-09

## 2017-08-05 RX ORDER — IBUPROFEN 400 MG/1
200 TABLET ORAL EVERY 4 HOURS PRN
Status: DISCONTINUED | OUTPATIENT
Start: 2017-08-05 | End: 2017-08-09

## 2017-08-05 RX ORDER — SODIUM CHLORIDE 9 MG/ML
INJECTION, SOLUTION INTRAVENOUS CONTINUOUS
Status: ACTIVE | OUTPATIENT
Start: 2017-08-05 | End: 2017-08-05

## 2017-08-05 RX ORDER — POTASSIUM CHLORIDE 20 MEQ/1
40 TABLET, EXTENDED RELEASE ORAL ONCE
Status: COMPLETED | OUTPATIENT
Start: 2017-08-05 | End: 2017-08-05

## 2017-08-05 RX ORDER — IBUPROFEN 400 MG/1
400 TABLET ORAL EVERY 4 HOURS PRN
Status: DISCONTINUED | OUTPATIENT
Start: 2017-08-05 | End: 2017-08-09

## 2017-08-05 RX ORDER — SODIUM CHLORIDE 9 MG/ML
INJECTION, SOLUTION INTRAVENOUS CONTINUOUS
Status: DISCONTINUED | OUTPATIENT
Start: 2017-08-05 | End: 2017-08-09

## 2017-08-05 RX ORDER — HEPARIN SODIUM 5000 [USP'U]/ML
5000 INJECTION, SOLUTION INTRAVENOUS; SUBCUTANEOUS EVERY 8 HOURS SCHEDULED
Status: DISCONTINUED | OUTPATIENT
Start: 2017-08-05 | End: 2017-08-09

## 2017-08-05 RX ORDER — ONDANSETRON 2 MG/ML
4 INJECTION INTRAMUSCULAR; INTRAVENOUS EVERY 6 HOURS PRN
Status: DISCONTINUED | OUTPATIENT
Start: 2017-08-05 | End: 2017-08-09

## 2017-08-05 RX ORDER — POLYETHYLENE GLYCOL 3350 17 G/17G
17 POWDER, FOR SOLUTION ORAL DAILY PRN
Status: DISCONTINUED | OUTPATIENT
Start: 2017-08-05 | End: 2017-08-09

## 2017-08-05 RX ORDER — LISINOPRIL 10 MG/1
10 TABLET ORAL DAILY
Status: DISCONTINUED | OUTPATIENT
Start: 2017-08-05 | End: 2017-08-09

## 2017-08-05 RX ORDER — TRAMADOL HYDROCHLORIDE 50 MG/1
50 TABLET ORAL 3 TIMES DAILY
Status: DISCONTINUED | OUTPATIENT
Start: 2017-08-05 | End: 2017-08-09

## 2017-08-05 RX ORDER — SODIUM PHOSPHATE, DIBASIC AND SODIUM PHOSPHATE, MONOBASIC 7; 19 G/133ML; G/133ML
1 ENEMA RECTAL ONCE AS NEEDED
Status: DISCONTINUED | OUTPATIENT
Start: 2017-08-05 | End: 2017-08-09

## 2017-08-05 RX ORDER — BISACODYL 10 MG
10 SUPPOSITORY, RECTAL RECTAL
Status: DISCONTINUED | OUTPATIENT
Start: 2017-08-05 | End: 2017-08-09

## 2017-08-05 RX ORDER — SODIUM CHLORIDE 9 MG/ML
INJECTION, SOLUTION INTRAVENOUS ONCE
Status: COMPLETED | OUTPATIENT
Start: 2017-08-05 | End: 2017-08-06

## 2017-08-05 RX ORDER — TRAMADOL HYDROCHLORIDE 50 MG/1
50 TABLET ORAL 3 TIMES DAILY
Status: DISCONTINUED | OUTPATIENT
Start: 2017-08-05 | End: 2017-08-05

## 2017-08-05 RX ORDER — MAGNESIUM OXIDE 400 MG (241.3 MG MAGNESIUM) TABLET
800 TABLET ONCE
Status: COMPLETED | OUTPATIENT
Start: 2017-08-05 | End: 2017-08-05

## 2017-08-05 NOTE — ED PROVIDER NOTES
Patient Seen in: [facility] Emergency Department    History   Patient presents with:  Fatigue (constitutional, neurologic)    Stated Complaint: Weakness    HPI    80-year-old female comes in the hospital she complained of feeling more weak today.   [place] Oral Tab,  Take 400 mg by mouth every 6 (six) hours as needed for Pain. lisinopril (PRINIVIL,ZESTRIL) 10 MG Oral Tab,  Take 10 mg by mouth daily.  Indications: High Blood Pressure   aspirin-acetaminophen-caffeine (PAIN RELIEVER PLUS) 250-250-65 MG Oral Ta 0.48 (*)     Albumin 3.2 (*)     Sodium 124 (*)     Potassium 2.9 (*)     Chloride 83 (*)     All other components within normal limits   URINALYSIS WITH CULTURE REFLEX - Abnormal; Notable for the following:     Blood Urine Small (*)     RBC URINE 6-10 (*) there is also associated soft tissue swelling nearby in the medial wrist. Correlate with point tenderness Advanced osteopenia, bones are markedly demineralized.  There is also advanced chronic degenerative arthropathy in the lateral wrist, moderate diffuse ICD-10-CM Noted POA    Dehydration E86.0 8/5/2017 Unknown

## 2017-08-05 NOTE — PROGRESS NOTES
08/05/17 1354   Clinical Encounter Type   Visited With Patient and family together   Crisis Visit ED  (Pt. fell at home and pt. and family wanted prayers)   Mosque Encounters   Mosque Needs Prayer   Spiritual Requests During Visit / Kristen Segovia

## 2017-08-05 NOTE — ED NOTES
Round on pt. Updated on eta to floor. Family at bedside. No distress noted. Pt denies any needs prior to transport.

## 2017-08-05 NOTE — ED INITIAL ASSESSMENT (HPI)
Pt to ED via EMS post fall. Pt was standing with walker and fell to ground. Daughter sts pt has been increasingly weak over last 24 hours. Decreased appetitie and fluid intake. Pt did not hit head. Skin tear to right elbow.  Family reports pt fell last week

## 2017-08-05 NOTE — ED NOTES
Round on pt. Updated on poc. Family at bedside. Pt repositioned in bed. Pt denies any needs. Family sts pt will not be able to swallow potassium pills. Pills dissolved in water.

## 2017-08-05 NOTE — ED NOTES
Ready for XR    Family at bedside. Updated on poc. Pt minimally responding to questions. Family sts this is not pt's norm. Pt denying any pain.

## 2017-08-05 NOTE — ED NOTES
Daughters at bedside report pt has a care companion at home that is part-time. Pt has been having increased falls at home.

## 2017-08-05 NOTE — CM/SW NOTE
Patient known to me from ER visit 7/30 when she fell. Since discharge then, family and hired caregivers combined have been providing 24 hour supervision.      I met with patient (who is lethergic and not able to participate in any meaningful conversation) a

## 2017-08-05 NOTE — H&P
[facility] H[facility]PITAL[facility]  History and Physical     Gregory Lmab Patient Status:  Inpatient    1920 MRN [id number]   [facility] 3NE-A Attending Marybel Ortiz [address] Day # 0 PCP Shivani Cole MD     Chief Complaint: weakness tablet by mouth 2 (two) times daily. Disp: 20 tablet Rfl: 0   TraMADol HCl 50 MG Oral Tab Take 1 tablet (50 mg total) by mouth 3 (three) times daily.  Disp: 20 tablet Rfl: 0   docusate sodium (COLACE) 100 MG Oral Cap Take 1 capsule (100 mg total) by mouth 3 nondistended. Positive bowel sounds. No rebound, guarding or organomegaly. Neurologic: No focal neurological deficits. CNII-XII grossly intact. Musculoskeletal: Moves all extremities. Extremities: No edema or cyanosis.   Integument: No rashes or lesions

## 2017-08-06 LAB
BUN BLD-MCNC: 9 MG/DL (ref 8–20)
CALCIUM BLD-MCNC: 8.2 MG/DL (ref 8.3–10.3)
CHLORIDE: 95 MMOL/L (ref 101–111)
CO2: 31 MMOL/L (ref 22–32)
CREAT BLD-MCNC: 0.43 MG/DL (ref 0.55–1.02)
ERYTHROCYTE [DISTWIDTH] IN BLOOD BY AUTOMATED COUNT: 13.2 % (ref 11.5–16)
GLUCOSE BLD-MCNC: 87 MG/DL (ref 70–99)
HAV IGM SER QL: 1.8 MG/DL (ref 1.7–3)
HCT VFR BLD AUTO: 31.5 % (ref 34–50)
HGB BLD-MCNC: 11.2 G/DL (ref 12–16)
MCH RBC QN AUTO: 29.9 PG (ref 27–33.2)
MCHC RBC AUTO-ENTMCNC: 35.6 G/DL (ref 31–37)
MCV RBC AUTO: 84.2 FL (ref 81–100)
PLATELET # BLD AUTO: 288 10(3)UL (ref 150–450)
POTASSIUM SERPL-SCNC: 3.5 MMOL/L (ref 3.6–5.1)
POTASSIUM SERPL-SCNC: 4.3 MMOL/L (ref 3.6–5.1)
RBC # BLD AUTO: 3.74 X10(6)UL (ref 3.8–5.1)
RED CELL DISTRIBUTION WIDTH-SD: 40.4 FL (ref 35.1–46.3)
SODIUM SERPL-SCNC: 130 MMOL/L (ref 136–144)
WBC # BLD AUTO: 8.9 X10(3) UL (ref 4–13)

## 2017-08-06 PROCEDURE — 99232 SBSQ HOSP IP/OBS MODERATE 35: CPT | Performed by: HOSPITALIST

## 2017-08-06 RX ORDER — POTASSIUM CHLORIDE 20 MEQ/1
40 TABLET, EXTENDED RELEASE ORAL EVERY 4 HOURS
Status: COMPLETED | OUTPATIENT
Start: 2017-08-06 | End: 2017-08-06

## 2017-08-06 RX ORDER — MAGNESIUM OXIDE 400 MG (241.3 MG MAGNESIUM) TABLET
400 TABLET ONCE
Status: COMPLETED | OUTPATIENT
Start: 2017-08-06 | End: 2017-08-06

## 2017-08-06 NOTE — PROGRESS NOTES
[facility]  Progress Note     Charli Medina Patient Status:  Observation    1920 MRN [id number]   [facility] 3NE-A Attending Case Ariel [address] Day # 1 PCP Antione Luong MD     Chief Complaint: lethargy and confu Epic.    Medications:   • Potassium Chloride ER  40 mEq Oral Q4H   • lisinopril  10 mg Oral Daily   • Heparin Sodium (Porcine)  5,000 Units Subcutaneous Q8H [address]   • docusate sodium  100 mg Oral BID   • TraMADol HCl  50 mg Oral TID       ASSESSMENT / PLAN:

## 2017-08-06 NOTE — PROGRESS NOTES
08/06/17 1424   Clinical Encounter Type   Visited With Patient; Health care provider   Routine Visit Follow-up   Continue Visiting Yes  (upon request or as needed)   Referral From Nurse   Sacramental Encounters   Sacrament of Sick-Anointing Family reques

## 2017-08-06 NOTE — DIETARY NOTE
[address] ASSESSMENT    Pt does not meet moderate malnutrition criteria.     NUTRITION DIAGNOSIS/PROBLEM:    Inadequate oral intake related to decreased appetite as evidenced by reported <50% po intake over several days, 6 lb johnathang calories/day (25-30 calories per kg)  Protein: 60-75 grams protein/day (1.2-1.5 grams protein per kg)  Fluid: ~1 ml/kcal or per MD discretion    MONITOR AND EVALUATE/NUTRITION GOALS:    1. PO intake to meet at least 75% patient nutrition prescription  2.  A

## 2017-08-06 NOTE — PLAN OF CARE
Patient/Family Goals    • Patient/Family Long Term Goal Progressing    • Patient/Family Short Term Goal Progressing            Pt admitted from ER with dehydration. Pt a/ox2, VSS, on RA. No c/o pain, n/v, dizziness/lightheadedness, SOB.  IVF running 0.9NS a

## 2017-08-06 NOTE — PLAN OF CARE
Pt back to bed. 1-2 person assist. Up to [facility]   Family at bedside. Slightly confused.  No complaints

## 2017-08-07 LAB
BUN BLD-MCNC: 7 MG/DL (ref 8–20)
CALCIUM BLD-MCNC: 8.3 MG/DL (ref 8.3–10.3)
CHLORIDE: 96 MMOL/L (ref 101–111)
CO2: 25 MMOL/L (ref 22–32)
CREAT BLD-MCNC: 0.37 MG/DL (ref 0.55–1.02)
ERYTHROCYTE [DISTWIDTH] IN BLOOD BY AUTOMATED COUNT: 13.8 % (ref 11.5–16)
GLUCOSE BLD-MCNC: 105 MG/DL (ref 70–99)
HAV IGM SER QL: 1.9 MG/DL (ref 1.7–3)
HCT VFR BLD AUTO: 32.2 % (ref 34–50)
HGB BLD-MCNC: 11.2 G/DL (ref 12–16)
MCH RBC QN AUTO: 29.7 PG (ref 27–33.2)
MCHC RBC AUTO-ENTMCNC: 34.8 G/DL (ref 31–37)
MCV RBC AUTO: 85.4 FL (ref 81–100)
PLATELET # BLD AUTO: 288 10(3)UL (ref 150–450)
POTASSIUM SERPL-SCNC: 4 MMOL/L (ref 3.6–5.1)
RBC # BLD AUTO: 3.77 X10(6)UL (ref 3.8–5.1)
RED CELL DISTRIBUTION WIDTH-SD: 43 FL (ref 35.1–46.3)
SODIUM SERPL-SCNC: 129 MMOL/L (ref 136–144)
WBC # BLD AUTO: 10.1 X10(3) UL (ref 4–13)

## 2017-08-07 PROCEDURE — 99232 SBSQ HOSP IP/OBS MODERATE 35: CPT | Performed by: HOSPITALIST

## 2017-08-07 PROCEDURE — 99232 SBSQ HOSP IP/OBS MODERATE 35: CPT | Performed by: NURSE PRACTITIONER

## 2017-08-07 NOTE — CONSULTS
[address]  [id number]  Hospital Day #1  Date of Consult: 08/07/17       Reason for Consultation: Consult requested for evaluation of palliative care needs and goals of care discussion.     H Daughter agrees to home palliative care referral.    Advance Directive: Patient/family asked to bring in  Type of Healthcare Directive: Durable power of  for health care  Healthcare Agent Appointed: Yes  Healthcare Agent's Name: kelli Soliman

## 2017-08-07 NOTE — PAYOR COMM NOTE
--------------  ADMISSION REVIEW     8/5    ED    Fatigue      Stated Complaint: Weakness          80year-old female comes in the hospital she complained of feeling more weak today  Pt  More lethargic last 24 hours    She does have a history of having deh normal limits   TSH W REFLEX TO FREE T4 - Normal   TROPONIN I - Normal           EKG     Rate, intervals and axes as noted on EKG Report.   Rate: 102  Rhythm: Sinus tachycardia  Reading: Agreed                     Clinical Impression:  Dehydration acute  We

## 2017-08-07 NOTE — PROGRESS NOTES
[facility]  Progress Note     Jackie Valente Patient Status:  Observation    1920 MRN [id number]   [facility] 3NE-A Attending Katty Schreiber [address] Day # 1 PCP Gudelia Bernal MD     Chief Complaint: lethargy and confu this interval not displayed. Estimated Creatinine Clearance: 59.9 mL/min (based on SCr of 0.37 mg/dL). No results for input(s): PTP, INR in the last 72 hours.     Recent Labs   Lab  08/05/17   1314   TROP  <0.046            Imaging: Imaging data re

## 2017-08-07 NOTE — OCCUPATIONAL THERAPY NOTE
OCCUPATIONAL THERAPY EVALUATION - INPATIENT     Room Number: 9937/5608-M  Evaluation Date: 8/7/2017  Type of Evaluation: Initial  Presenting Problem: Dehydration    Physician Order: IP Consult to Occupational Therapy  Reason for Therapy: ADL/IADL Dysfuncti recall of biographical information and decreased recall of recent events  Following Commands:  follows one-step commands inconsistently and follows multi-step commands inconsistently  Initiation: cues to initiate tasks  Motor Planning: impaired  Perseverat Lot    AM-PAC Score:  Score: 11  Approx Degree of Impairment: 70.42%  Standardized Score (AM-PAC Scale): 29.04  CMS Modifier (G-Code): CL    FUNCTIONAL TRANSFER ASSESSMENT  Supine to Sit : Dependent assistance  Sit to Stand:  Moderate assistance    Skilled situation to accommodate safety needs. OT Discharge Recommendations: Sub-acute rehabilitation (12-14 days)  OT Device Recommendations: None    PLAN  OT Treatment Plan: Balance activities; Energy conservation/work simplification techniques;ADL training;IA

## 2017-08-07 NOTE — CM/SW NOTE
SW found pt thru casefinding for readmission risk and spoke with pt's daughter Mundo Failing pt for assessment and d/c planning. Pt is a 80 y.o female admitted on 08/05 with dx of weakness and dehydration.  Pt's most recent admission was 03/02-03/06 with d/c to [facility].

## 2017-08-07 NOTE — PROGRESS NOTES
08/07/17 1836   Clinical Encounter Type   Visited With Patient and family together   Routine Visit Follow-up   Continue Visiting Yes   Patient's Supportive Strategies/Resources Patient expressed gratitude of support of family.   gave family suppo

## 2017-08-07 NOTE — PHYSICAL THERAPY NOTE
PHYSICAL THERAPY EVALUATION - INPATIENT     Room Number: 5676/4438-X  Evaluation Date: 8/7/2017  Type of Evaluation: Initial  Physician Order: PT Eval and Treat    Presenting Problem: s/p fall, dehydration  Reason for Therapy: Mobility Dysfunction and left along for a few hours in the afternoon).   Pt with hx of MELISA after fall in November 2016, with pt requiring lots of time to resume nearly her pre-fall baseline, per Theresa.  During last 2 weeks, pt ambulating with RW and supervision and assist with meals, the patient currently have. ..  -   Turning over in bed (including adjusting bedclothes, sheets and blankets)?: A Little   -   Sitting down on and standing up from a chair with arms (e.g., wheelchair, bedside commode, etc.): A Lot   -   Moving from lying on dehydration. Pertinent comorbidities and personal factors impacting therapy include 4 items as documented above.       In this PT evaluation, the patient presents with the following impairments: dec in strength, dec in balance, dec in endurance, dec in co rolling at assistance level: moderate assistance     (not fim score)   Goal #4 Compliance with activity recommendations.      Goal #5    Goal #6    Goal Comments: Goals established on 8/7/2017

## 2017-08-07 NOTE — PLAN OF CARE
Pt is alert and oriented x2, pt is mostly confused but able to express needs. Follows commands some of the time. Pt is able to rest comfortably, pt woke confused asking to be taken back to her \"original hotel room\".  Attempted to reorient pt but pt became

## 2017-08-08 LAB
BUN BLD-MCNC: 7 MG/DL (ref 8–20)
CALCIUM BLD-MCNC: 8 MG/DL (ref 8.3–10.3)
CHLORIDE: 100 MMOL/L (ref 101–111)
CO2: 25 MMOL/L (ref 22–32)
CREAT BLD-MCNC: 0.32 MG/DL (ref 0.55–1.02)
ERYTHROCYTE [DISTWIDTH] IN BLOOD BY AUTOMATED COUNT: 14 % (ref 11.5–16)
GLUCOSE BLD-MCNC: 88 MG/DL (ref 70–99)
HCT VFR BLD AUTO: 29.1 % (ref 34–50)
HGB BLD-MCNC: 10.1 G/DL (ref 12–16)
MCH RBC QN AUTO: 29.7 PG (ref 27–33.2)
MCHC RBC AUTO-ENTMCNC: 34.7 G/DL (ref 31–37)
MCV RBC AUTO: 85.6 FL (ref 81–100)
PLATELET # BLD AUTO: 263 10(3)UL (ref 150–450)
POTASSIUM SERPL-SCNC: 4 MMOL/L (ref 3.6–5.1)
RBC # BLD AUTO: 3.4 X10(6)UL (ref 3.8–5.1)
RED CELL DISTRIBUTION WIDTH-SD: 43 FL (ref 35.1–46.3)
SODIUM SERPL-SCNC: 132 MMOL/L (ref 136–144)
WBC # BLD AUTO: 8.6 X10(3) UL (ref 4–13)

## 2017-08-08 PROCEDURE — 99232 SBSQ HOSP IP/OBS MODERATE 35: CPT | Performed by: HOSPITALIST

## 2017-08-08 NOTE — CM/SW NOTE
RANJAN left message for pt's daughter Zacarias Martinez updating her on PT/OT recommendation for MELISA. PT/OT eval's sent to [facility]. Pat's, they are submitting for insurance auth.

## 2017-08-08 NOTE — PALLIATIVE CARE NOTE
PCSW contacted pts POA/Theresa [phone] who confirmed she would like [address] [facility] to follow pt at d/c; no further PCSW needs. PCSW sent [facility] to [facility]; ECIN accepted; notified of d/c today. PCSW advised Unit RANJAN/Shabana of above.     PC YOLANDAN/Coleman

## 2017-08-08 NOTE — PROGRESS NOTES
[facility]  Progress Note     Calleen Dancer Patient Status:  Observation    1920 MRN [id number]   [facility] 3NE-A [facility] PCP Elizabeth Cadena MD     Chief Complaint: lethargy and confu ALT  19   --    --    --    --    --    BILT  0.7   --    --    --    --    --    TP  6.5   --    --    --    --    --     < > = values in this interval not displayed. Estimated Creatinine Clearance: 69.3 mL/min (based on SCr of 0.32 mg/dL).     No

## 2017-08-08 NOTE — PLAN OF CARE
Impaired Activities of Daily Living    • Achieve highest/safest level of independence in self care Progressing        Impaired Functional Mobility    • Achieve highest/safest level of mobility/gait Progressing        METABOLIC/FLUID AND ELECTROLYTES - ADUL

## 2017-08-09 VITALS
HEIGHT: 62.01 IN | TEMPERATURE: 98 F | HEART RATE: 68 BPM | RESPIRATION RATE: 18 BRPM | SYSTOLIC BLOOD PRESSURE: 150 MMHG | DIASTOLIC BLOOD PRESSURE: 72 MMHG | BODY MASS INDEX: 17.11 KG/M2 | WEIGHT: 94.19 LBS | OXYGEN SATURATION: 99 %

## 2017-08-09 LAB
BUN BLD-MCNC: 7 MG/DL (ref 8–20)
CALCIUM BLD-MCNC: 7.4 MG/DL (ref 8.3–10.3)
CHLORIDE: 100 MMOL/L (ref 101–111)
CO2: 26 MMOL/L (ref 22–32)
CREAT BLD-MCNC: 0.36 MG/DL (ref 0.55–1.02)
GLUCOSE BLD-MCNC: 91 MG/DL (ref 70–99)
POTASSIUM SERPL-SCNC: 3.7 MMOL/L (ref 3.6–5.1)
SODIUM SERPL-SCNC: 131 MMOL/L (ref 136–144)
SODIUM SERPL-SCNC: 81 MMOL/L

## 2017-08-09 PROCEDURE — 99239 HOSP IP/OBS DSCHRG MGMT >30: CPT | Performed by: INTERNAL MEDICINE

## 2017-08-09 RX ORDER — POTASSIUM CHLORIDE 20 MEQ/1
40 TABLET, EXTENDED RELEASE ORAL ONCE
Status: COMPLETED | OUTPATIENT
Start: 2017-08-09 | End: 2017-08-09

## 2017-08-09 RX ORDER — TRAMADOL HYDROCHLORIDE 50 MG/1
50 TABLET ORAL EVERY 8 HOURS PRN
Qty: 20 TABLET | Refills: 0 | Status: ON HOLD | OUTPATIENT
Start: 2017-08-09 | End: 2018-02-21

## 2017-08-09 NOTE — PHYSICAL THERAPY NOTE
PHYSICAL THERAPY TREATMENT NOTE - INPATIENT    Room Number: 4187/8950-X     Session: 1   Number of Visits to Meet Established Goals: 5    Presenting Problem: s/p fall, dehydration    Problem List  Principal Problem:    Dehydration  Active Problems:    Wea Little   -   Climbing 3-5 steps with a railing?: Total       AM-PAC Score:  Raw Score: 16   PT Approx Degree of Impairment Score: 54.16%   Standardized Score (AM-PAC Scale): 40.78   CMS Modifier (G-Code): CK    FUNCTIONAL ABILITY STATUS  Gait Assessment to above deficits, Pt will benefit from continued IP PT, so that patient may achieve highest functional independence/return to baseline.  Recommend HHPT with 24 hour care/supervision upon [facility] d/c. '      DISCHARGE RECOMMENDATIONS  PT Discharge R

## 2017-08-09 NOTE — PLAN OF CARE
Pt from home with 24hr caregiver with dehydration, low Na and low K. Plan per report yesterday is to go to [facility] today. Pt being treated with IVF. Pt aox4, RA, no tele, bed alarm in place for high fall risk.  Pt stated pain in left shoulder, back of neck,

## 2017-08-09 NOTE — DISCHARGE SUMMARY
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 She was treated conservatively for her ulnar fracture.     Procedures during hospitalization:   Xr Wrist Complete (min 3 Views), Left (cpt=[id number])    Res tablet by mouth daily as needed. Indications: Generalized Ache   Refills:  0     Calcium Carbonate Antacid 500 MG Chew  Commonly known as:  TUMS      Chew 1 tablet by mouth every 6 (six) hours as needed for Heartburn.    Refills:  0     docusate sodium 100 edema.  -----------------------------------------------------------------------------------------------  PATIENT DISCHARGE INSTRUCTIONS: See electronic chart    Goldie Lee MD 8/9/2017    Time spent:  > 30 minutes

## 2017-08-09 NOTE — PROGRESS NOTES
NURSING DISCHARGE NOTE    Discharged Rehab facility via Ambulance. Accompanied by Family member and Support staff  Belongings Taken by patient/family. Patient transferred to [address] [place] [address]. Report called to RN.  Med rec and discharge instruction sent with alem

## 2017-08-09 NOTE — CM/SW NOTE
RANJAN left message for Bevtoft at [facility]. [facility] ([phone]) following up on status if insurance auth. ADDENDUM:  RANJAN confirmed with Abigail at [facility]. [facility] they have received insurance auth and can accept pt today, RN to call report to [phone].  ECIN

## 2017-08-09 NOTE — PROGRESS NOTES
[facility] H[facility]PITAL[facility]  Progress Note     Jackie Valente Patient Status:  Observation    1920 MRN [id number]   [facility] 3NE-A Attending [facility] Day # 3 PCP Gudelia Bernal MD     Chief Complaint: lethargy and confu resolving   2. Hyponatremia- 131, stop IVF, check urine Na. I suspect this is multiple factorial with dehydration, low solute intake, and partial SIADH all occurring   3. Hypokalemia-we will replace per protocol.   4. Generalized weakness-likely due to dehy

## 2017-08-09 NOTE — OCCUPATIONAL THERAPY NOTE
OCCUPATIONAL THERAPY TREATMENT NOTE - INPATIENT     Room Number: 3927/4232-A  Session: 1  Number of Visits to Meet Established Goals: 5    Presenting Problem: Dehydration    History related to current admission: Pt admitted on 8/5/17 for dehydration.  Pt ha ASSESSMENT  Supine to Sit : Not tested  Sit to Stand: Minimum assistance (CGA)    Skilled Therapy Provided: Pt received seated in chair for OT tx session.  Pt required some encouragement to participate in session but once plan was discussed, pt became willi care/supervision;Home  OT Device Recommendations: None    PLAN  OT Treatment Plan: Balance activities; Energy conservation/work simplification techniques;ADL training;IADL training;Functional transfer training;UE strengthening/ROM; Endurance training;[employer]

## 2017-08-11 NOTE — CM/SW NOTE
Pt d/c 08/10 to [facility].       08/11/17 1300   Discharge disposition   Discharged to: Skilled Nurs   Name of [address] [place] [address]   Patient is Discharged to a [address] [place], [place] [address]d Yes   Discharge transportation [employer]

## 2017-08-14 ENCOUNTER — SNF VISIT (OUTPATIENT)
Dept: INTERNAL MEDICINE CLINIC | Age: 82
End: 2017-08-14

## 2017-08-14 VITALS
DIASTOLIC BLOOD PRESSURE: 76 MMHG | RESPIRATION RATE: 18 BRPM | OXYGEN SATURATION: 96 % | HEART RATE: 88 BPM | TEMPERATURE: 98 F | SYSTOLIC BLOOD PRESSURE: 131 MMHG

## 2017-08-14 DIAGNOSIS — R53.81 PHYSICAL DECONDITIONING: Primary | ICD-10-CM

## 2017-08-14 DIAGNOSIS — E87.1 HYPONATREMIA: ICD-10-CM

## 2017-08-14 DIAGNOSIS — S52.615F: ICD-10-CM

## 2017-08-14 PROCEDURE — 99309 SBSQ NF CARE MODERATE MDM 30: CPT | Performed by: NURSE PRACTITIONER

## 2017-08-14 PROCEDURE — 99356 PROLONGED SERV,INPATIENT,1ST HR: CPT | Performed by: NURSE PRACTITIONER

## 2017-08-14 RX ORDER — SODIUM CHLORIDE 1000 MG
1 TABLET, SOLUBLE MISCELLANEOUS 2 TIMES DAILY WITH MEALS
COMMUNITY
End: 2018-02-14

## 2017-08-15 NOTE — PROGRESS NOTES
Johanny Fritz  : 1920  Age 80year old  female patient is admitted to Facility: [address] [place] [address] [address] Residence for [address] [place] [address] date:  17  Discharge date to [facility]:  17  ELOS:  7-14 days  Anticipated discharge date:  17    [facility] Cap Take 100 mg by mouth 2 (two) times daily as needed for constipation. Disp:  Rfl:    Calcium Carbonate Antacid 500 MG Oral Chew Tab Chew 1 tablet by mouth every 6 (six) hours as needed for Heartburn.  Disp:  Rfl:    acetaminophen 325 MG Oral Tab Take 325 me  LINES, TUBES, DRAINS:  none  SKIN: pink, warm, dry  WOUND: abrasion right elbow  EYES: PERRLA, EOMI, sclera anicteric, conjunctiva normal; there is no nystagmus, no drainage from eyes  HENT: normocephalic; normal nose, no nasal drainage, mucous membran [address] [facility],[address], [address] [facility], Toby Sethi M.D., Medical  Director, [facility] [id number]  COMPREHENSIVE METABOLIC  GLUCOSE 83 mg/dl 70 - 110 Final  BUN 8 mg/dl 8 - 28 Final  CREATININE 0.38 mg/dl 0.40 - 1.60 L Final  BILIRUBIN, TOTAL 0.52 mg/dl and imaging results reviewed. Medication reconciliation completed. SEE PLAN BELOW  Physical Deconditioning/Weakness, Falls  1. PT/OT at [facility] 12  2. Goal discharge 8/19/17  3. Pt will need continued 24 hour caregiver/LTC  4.  Pt will benefit from fall re

## 2017-08-16 ENCOUNTER — SNF VISIT (OUTPATIENT)
Dept: INTERNAL MEDICINE CLINIC | Age: 82
End: 2017-08-16

## 2017-08-16 VITALS
DIASTOLIC BLOOD PRESSURE: 76 MMHG | SYSTOLIC BLOOD PRESSURE: 118 MMHG | TEMPERATURE: 96 F | OXYGEN SATURATION: 96 % | RESPIRATION RATE: 18 BRPM | HEART RATE: 87 BPM

## 2017-08-16 DIAGNOSIS — R53.81 PHYSICAL DECONDITIONING: ICD-10-CM

## 2017-08-16 DIAGNOSIS — E87.1 HYPONATREMIA: ICD-10-CM

## 2017-08-16 DIAGNOSIS — S52.615D CLOSED NONDISPLACED FRACTURE OF STYLOID PROCESS OF LEFT ULNA WITH ROUTINE HEALING, SUBSEQUENT ENCOUNTER: Primary | ICD-10-CM

## 2017-08-16 PROCEDURE — 99307 SBSQ NF CARE SF MDM 10: CPT | Performed by: NURSE PRACTITIONER

## 2017-08-16 NOTE — PROGRESS NOTES
Yoana Bear, 61/1920, 80year old, female    Chief Complaint:  Patient presents with:  Fracture: left wrist  Weakness     [place] [facility] Admit date:  8/5/17  Discharge date to [facility]:  8/9/17  ELOS:  7-14 days  Anticipated discharge date:  8/19/17  [facility] EXAM:  GENERAL HEALTH: petite female, well-nourished, well-developed, in no apparent distress. Sitting with son at bedside, visiting.   LINES, TUBES, DRAINS:  none  SKIN: pink, warm, dry  WOUND: abrasion right elbow  EYES: PERRLA, EOMI, sclera anicteric, c Migraine Headache  1. Aspirin-Acetaminophen-Caffeine [phone] 1 tablet po daily     Heartburn/Indigestion  1.  Calcium Carbonate Antacid 500mg chew-1 tablet po q 6 hrs PRN heartburn    DAVID Mak  8/16/2017  8:31 AM

## 2017-08-23 ENCOUNTER — SNF VISIT (OUTPATIENT)
Dept: INTERNAL MEDICINE CLINIC | Age: 82
End: 2017-08-23

## 2017-08-23 DIAGNOSIS — R53.81 PHYSICAL DECONDITIONING: ICD-10-CM

## 2017-08-23 DIAGNOSIS — R51.9 ACUTE NONINTRACTABLE HEADACHE, UNSPECIFIED HEADACHE TYPE: Primary | ICD-10-CM

## 2017-08-23 DIAGNOSIS — E87.1 HYPONATREMIA: ICD-10-CM

## 2017-08-23 PROCEDURE — 99308 SBSQ NF CARE LOW MDM 20: CPT | Performed by: NURSE PRACTITIONER

## 2017-08-23 NOTE — PROGRESS NOTES
Jackie Valente, 61/1920, 80year old, female    Chief Complaint:  Patient presents with:  Headache  Weakness     [facility] Admit date:  8/5/17  Discharge date to [facility]:  8/9/17  ELOS:  7-14 days  Anticipated discharge date:  8/29/17 or as per insuran PT/OT/ST to evaluate and treat and DME as recommended by rehab therapy staff.       Objective:  VITALS:  /71   Pulse 91   Temp 98.5 °F (36.9 °C)   Resp 18   SpO2 96%     PHYSICAL EXAM:  GENERAL HEALTH: petite female, well-nourished, well-developed, in osteoarthritis)  1. Left wrist splint  2. NWB status to left arm  3. PT  4. Acetaminophen 650mg po q 4 hrs PRN mild pain/fever  5. Tramadol 50mg q 8 hrs PRN mod-severe pain  6.  Splint removed per private PCP/ full range of motion rec with PT    Hyponatremi

## 2017-08-24 VITALS
DIASTOLIC BLOOD PRESSURE: 71 MMHG | OXYGEN SATURATION: 96 % | TEMPERATURE: 99 F | RESPIRATION RATE: 18 BRPM | HEART RATE: 91 BPM | SYSTOLIC BLOOD PRESSURE: 115 MMHG

## 2017-08-24 RX ORDER — ALENDRONATE SODIUM 70 MG/1
70 TABLET ORAL WEEKLY
COMMUNITY

## 2017-08-24 RX ORDER — CALCITONIN SALMON 200 [IU]/.09ML
1 SPRAY, METERED NASAL DAILY
COMMUNITY

## 2018-02-14 ENCOUNTER — APPOINTMENT (OUTPATIENT)
Dept: CT IMAGING | Facility: HOSPITAL | Age: 83
DRG: 469 | End: 2018-02-14
Attending: EMERGENCY MEDICINE
Payer: MEDICARE

## 2018-02-14 ENCOUNTER — HOSPITAL ENCOUNTER (INPATIENT)
Facility: HOSPITAL | Age: 83
LOS: 4 days | Discharge: NURSING, REHAB OR LONG-TERM CARE | DRG: 469 | End: 2018-02-22
Attending: EMERGENCY MEDICINE | Admitting: INTERNAL MEDICINE
Payer: MEDICARE

## 2018-02-14 ENCOUNTER — APPOINTMENT (OUTPATIENT)
Dept: GENERAL RADIOLOGY | Facility: HOSPITAL | Age: 83
DRG: 469 | End: 2018-02-14
Attending: EMERGENCY MEDICINE
Payer: MEDICARE

## 2018-02-14 DIAGNOSIS — S42.211A CLOSED FRACTURE OF NECK OF RIGHT HUMERUS, INITIAL ENCOUNTER: Primary | ICD-10-CM

## 2018-02-14 DIAGNOSIS — I95.9 HYPOTENSION, UNSPECIFIED HYPOTENSION TYPE: ICD-10-CM

## 2018-02-14 DIAGNOSIS — S72.001A CLOSED RIGHT HIP FRACTURE (HCC): ICD-10-CM

## 2018-02-14 DIAGNOSIS — S01.311A LACERATION OF RIGHT EAR LOBE, INITIAL ENCOUNTER: ICD-10-CM

## 2018-02-14 LAB
BASOPHILS # BLD AUTO: 0.03 X10(3) UL (ref 0–0.1)
BASOPHILS NFR BLD AUTO: 0.2 %
BUN BLD-MCNC: 13 MG/DL (ref 8–20)
CALCIUM BLD-MCNC: 8.7 MG/DL (ref 8.3–10.3)
CHLORIDE: 104 MMOL/L (ref 101–111)
CO2: 24 MMOL/L (ref 22–32)
CREAT BLD-MCNC: 0.45 MG/DL (ref 0.55–1.02)
EOSINOPHIL # BLD AUTO: 0.01 X10(3) UL (ref 0–0.3)
EOSINOPHIL NFR BLD AUTO: 0.1 %
ERYTHROCYTE [DISTWIDTH] IN BLOOD BY AUTOMATED COUNT: 14 % (ref 11.5–16)
GLUCOSE BLD-MCNC: 131 MG/DL (ref 70–99)
HCT VFR BLD AUTO: 30.3 % (ref 34–50)
HGB BLD-MCNC: 10.1 G/DL (ref 12–16)
IMMATURE GRANULOCYTE COUNT: 0.11 X10(3) UL (ref 0–1)
IMMATURE GRANULOCYTE RATIO %: 0.6 %
LYMPHOCYTES # BLD AUTO: 0.8 X10(3) UL (ref 0.9–4)
LYMPHOCYTES NFR BLD AUTO: 4.3 %
MCH RBC QN AUTO: 30.3 PG (ref 27–33.2)
MCHC RBC AUTO-ENTMCNC: 33.3 G/DL (ref 31–37)
MCV RBC AUTO: 91 FL (ref 81–100)
MONOCYTES # BLD AUTO: 1.04 X10(3) UL (ref 0.1–1)
MONOCYTES NFR BLD AUTO: 5.6 %
NEUTROPHIL ABS PRELIM: 16.62 X10 (3) UL (ref 1.3–6.7)
NEUTROPHILS # BLD AUTO: 16.62 X10(3) UL (ref 1.3–6.7)
NEUTROPHILS NFR BLD AUTO: 89.2 %
PLATELET # BLD AUTO: 199 10(3)UL (ref 150–450)
POTASSIUM SERPL-SCNC: 3.9 MMOL/L (ref 3.6–5.1)
RBC # BLD AUTO: 3.33 X10(6)UL (ref 3.8–5.1)
RED CELL DISTRIBUTION WIDTH-SD: 47.2 FL (ref 35.1–46.3)
SODIUM SERPL-SCNC: 136 MMOL/L (ref 136–144)
WBC # BLD AUTO: 18.6 X10(3) UL (ref 4–13)

## 2018-02-14 PROCEDURE — 70450 CT HEAD/BRAIN W/O DYE: CPT | Performed by: EMERGENCY MEDICINE

## 2018-02-14 PROCEDURE — 73030 X-RAY EXAM OF SHOULDER: CPT | Performed by: EMERGENCY MEDICINE

## 2018-02-14 PROCEDURE — 99223 1ST HOSP IP/OBS HIGH 75: CPT | Performed by: INTERNAL MEDICINE

## 2018-02-14 RX ORDER — ONDANSETRON 2 MG/ML
4 INJECTION INTRAMUSCULAR; INTRAVENOUS EVERY 4 HOURS PRN
Status: CANCELLED | OUTPATIENT
Start: 2018-02-14

## 2018-02-14 RX ORDER — SODIUM CHLORIDE 9 MG/ML
125 INJECTION, SOLUTION INTRAVENOUS CONTINUOUS
Status: DISCONTINUED | OUTPATIENT
Start: 2018-02-14 | End: 2018-02-15

## 2018-02-14 RX ORDER — KETOROLAC TROMETHAMINE 30 MG/ML
30 INJECTION, SOLUTION INTRAMUSCULAR; INTRAVENOUS EVERY 6 HOURS PRN
Status: DISPENSED | OUTPATIENT
Start: 2018-02-14 | End: 2018-02-16

## 2018-02-14 RX ORDER — SODIUM CHLORIDE 9 MG/ML
INJECTION, SOLUTION INTRAVENOUS CONTINUOUS
Status: DISCONTINUED | OUTPATIENT
Start: 2018-02-14 | End: 2018-02-15

## 2018-02-14 RX ORDER — POLYETHYLENE GLYCOL 3350 17 G/17G
17 POWDER, FOR SOLUTION ORAL DAILY PRN
Status: DISCONTINUED | OUTPATIENT
Start: 2018-02-14 | End: 2018-02-18

## 2018-02-14 RX ORDER — MORPHINE SULFATE 2 MG/ML
1.5 INJECTION, SOLUTION INTRAMUSCULAR; INTRAVENOUS EVERY 2 HOUR PRN
Status: DISCONTINUED | OUTPATIENT
Start: 2018-02-14 | End: 2018-02-18 | Stop reason: DRUGHIGH

## 2018-02-14 RX ORDER — ACETAMINOPHEN 325 MG/1
650 TABLET ORAL EVERY 6 HOURS PRN
Status: DISCONTINUED | OUTPATIENT
Start: 2018-02-14 | End: 2018-02-22

## 2018-02-14 RX ORDER — ENOXAPARIN SODIUM 100 MG/ML
30 INJECTION SUBCUTANEOUS DAILY
Status: DISCONTINUED | OUTPATIENT
Start: 2018-02-15 | End: 2018-02-17

## 2018-02-14 RX ORDER — MORPHINE SULFATE 2 MG/ML
2 INJECTION, SOLUTION INTRAMUSCULAR; INTRAVENOUS EVERY 2 HOUR PRN
Status: DISCONTINUED | OUTPATIENT
Start: 2018-02-14 | End: 2018-02-18 | Stop reason: DRUGHIGH

## 2018-02-14 RX ORDER — ONDANSETRON 2 MG/ML
4 INJECTION INTRAMUSCULAR; INTRAVENOUS ONCE
Status: COMPLETED | OUTPATIENT
Start: 2018-02-14 | End: 2018-02-14

## 2018-02-14 RX ORDER — ONDANSETRON 2 MG/ML
4 INJECTION INTRAMUSCULAR; INTRAVENOUS EVERY 6 HOURS PRN
Status: DISCONTINUED | OUTPATIENT
Start: 2018-02-14 | End: 2018-02-18

## 2018-02-14 RX ORDER — CALCITONIN SALMON 200 [IU]/.09ML
1 SPRAY, METERED NASAL DAILY
Status: DISCONTINUED | OUTPATIENT
Start: 2018-02-15 | End: 2018-02-22

## 2018-02-14 RX ORDER — MORPHINE SULFATE 2 MG/ML
1 INJECTION, SOLUTION INTRAMUSCULAR; INTRAVENOUS EVERY 2 HOUR PRN
Status: DISCONTINUED | OUTPATIENT
Start: 2018-02-14 | End: 2018-02-18 | Stop reason: DRUGHIGH

## 2018-02-14 RX ORDER — MORPHINE SULFATE 2 MG/ML
2 INJECTION, SOLUTION INTRAMUSCULAR; INTRAVENOUS ONCE
Status: COMPLETED | OUTPATIENT
Start: 2018-02-14 | End: 2018-02-14

## 2018-02-14 NOTE — ED NOTES
Report received from Sarah Schmidt at this time. Patient and family remain updated with results. Waiting for CT at this time. Reports continued pain, MD notified of this.

## 2018-02-14 NOTE — ED NOTES
Patient and family updated with plan for admission, Shoulder immobilizer applied by Rosie TAN. BP decreased and MD aware of this.

## 2018-02-14 NOTE — ED INITIAL ASSESSMENT (HPI)
Patient was at [address], had unwitnessed fall, hit head on walker, with contusion and laceration to right face above right ear. Patient with right shoulder deformity and swelling to right shoulder.

## 2018-02-14 NOTE — ED NOTES
Spoke with Teodoro Figueroa at [facility], informed that they will be able to manage patient if she returns to facility needing increased skilled nursing care. Family updated with this.

## 2018-02-15 PROBLEM — S01.311A LACERATION OF RIGHT EAR LOBE: Status: ACTIVE | Noted: 2018-02-14

## 2018-02-15 LAB
BILIRUB UR QL STRIP.AUTO: NEGATIVE
BUN BLD-MCNC: 18 MG/DL (ref 8–20)
CALCIUM BLD-MCNC: 8.2 MG/DL (ref 8.3–10.3)
CHLORIDE: 107 MMOL/L (ref 101–111)
CO2: 25 MMOL/L (ref 22–32)
CREAT BLD-MCNC: 0.6 MG/DL (ref 0.55–1.02)
ERYTHROCYTE [DISTWIDTH] IN BLOOD BY AUTOMATED COUNT: 14.1 % (ref 11.5–16)
GLUCOSE BLD-MCNC: 162 MG/DL (ref 70–99)
GLUCOSE UR STRIP.AUTO-MCNC: NEGATIVE MG/DL
HCT VFR BLD AUTO: 27.5 % (ref 34–50)
HGB BLD-MCNC: 9.1 G/DL (ref 12–16)
KETONES UR STRIP.AUTO-MCNC: 20 MG/DL
MCH RBC QN AUTO: 30.6 PG (ref 27–33.2)
MCHC RBC AUTO-ENTMCNC: 33.1 G/DL (ref 31–37)
MCV RBC AUTO: 92.6 FL (ref 81–100)
NITRITE UR QL STRIP.AUTO: POSITIVE
PH UR STRIP.AUTO: 6 [PH] (ref 4.5–8)
PLATELET # BLD AUTO: 134 10(3)UL (ref 150–450)
POTASSIUM SERPL-SCNC: 4.2 MMOL/L (ref 3.6–5.1)
PROT UR STRIP.AUTO-MCNC: 30 MG/DL
RBC # BLD AUTO: 2.97 X10(6)UL (ref 3.8–5.1)
RED CELL DISTRIBUTION WIDTH-SD: 47.8 FL (ref 35.1–46.3)
SODIUM SERPL-SCNC: 139 MMOL/L (ref 136–144)
SP GR UR STRIP.AUTO: 1.01 (ref 1–1.03)
UROBILINOGEN UR STRIP.AUTO-MCNC: <2 MG/DL
WBC # BLD AUTO: 12 X10(3) UL (ref 4–13)
WBC #/AREA URNS AUTO: >50 /HPF

## 2018-02-15 PROCEDURE — 99232 SBSQ HOSP IP/OBS MODERATE 35: CPT | Performed by: HOSPITALIST

## 2018-02-15 RX ORDER — TRAMADOL HYDROCHLORIDE 50 MG/1
50 TABLET ORAL EVERY 8 HOURS PRN
Status: DISCONTINUED | OUTPATIENT
Start: 2018-02-15 | End: 2018-02-17

## 2018-02-15 NOTE — PHYSICAL THERAPY NOTE
PHYSICAL THERAPY EVALUATION - INPATIENT     Room Number: 356/356-A  Evaluation Date: 2/15/2018  Type of Evaluation: Initial  Physician Order: PT Eval and Treat    Presenting Problem: S/p Fall - Right Humerus Fx - on 02/14/18  Reason for Therapy: Mobi Extremity: Non-Weight Bearing             PAIN ASSESSMENT  Ratin  Location: Right shoulder & arm  (Noted discolored patches on right arm & bilateral Legs)  Management Techniques: Activity promotion; Body mechanics;Breathing techniques;Relaxation;Reposit Therapy Provided: Evaluation completed,Patient was instructed in fall prevention strategies.      Exercise/Education Provided:  Bed mobility  Energy conservation  Functional activity tolerated  Transfer training    Patient End of Session: Up in chair;Needs of Visits to Meet Established Goals: 4      CURRENT GOALS    Goal #1 Patient is able to demonstrate supine - sit EOB @ level: supervision     Goal #2 Patient is able to demonstrate transfers EOB to/from Chair/Wheelchair at assistance level: supervision

## 2018-02-15 NOTE — PROGRESS NOTES
[facility]PITAL[facility]  Progress Note     Twin Vega Patient Status:  Observation    1920 MRN [id number]   [facility] 3SW-A Attending Manny Owen, [address] Day # 0 PCP Tania Hernandez MD     Chief Complaint: Desiree Artie: Patient Once BP improves can consider morphine IV for better pain control   2. Ortho consult   3. PT OT   4. Fall precaution   2. Left shoulder hematoma   1. Monitor clinically closely-radial pulse check   2. Check CBC in am   3. Hypotension   1.  Improved post IVF

## 2018-02-15 NOTE — PAYOR COMM NOTE
--------------  ADMISSION REVIEW       2/14  ED      80year old female with HTN,   presented after she had a fall at the NH today and fell on her right side. She denies any dizziness/CP/SOB/nausea prior to falling.    She has 10/10 pain in her shoulder now check   2. Check CBC in am   3. Hypotension   1. Improved post IVF   2. No infectious process per history   4. Leukocytosis - likely reactive, monitor   5. Normocytic anemia   6.  HTN - hold home med     MORPHINE IV X 3  ZOFRAN IV X 1     TORADOL IV

## 2018-02-15 NOTE — ED NOTES
Report to Corina Henderson RN at this time. Patient and family updated with plan for admission. Pain controlled at this time.

## 2018-02-15 NOTE — CONSULTS
[facility]    PATIENT'S NAME: London Soria   ATTENDING PHYSICIAN: Diaz Munson D.O.   CONSULTING PHYSICIAN: Nieves Chester M.D.    PATIENT ACCOUNT#:   [de-identified]    LOCATION:  [address] [place] [address]  MEDICAL RECORD #:   [id number]       DATE OF BIRTH: approximately 10 to 14 days for reevaluation in the office. Continue icing to the shoulder, moving the hand as discussed to prevent swelling. The patient will call the office if she has any questions before her followup. Thank for the consultation.

## 2018-02-15 NOTE — PROGRESS NOTES
02/15/18 1022   Clinical Encounter Type   Referral From Nurse   [facility]   Molly White will remain available at the pager # [phone] as needed/requested.

## 2018-02-15 NOTE — H&P
[place] HOSPITALIST  History and Physical     [facility] Patient Status:  Emergency    1920 MRN [id number]   Location [facility] [facility] Attending Prince Sellers MD   Hosp Day # 0 PCP Javier Werner MD     Chief Complaint:fall 4 (four) hours as needed for Pain (2 TABS). Disp:  Rfl:    Carboxymethylcellulose Sodium (REFRESH OP) Place 1 drop into both eyes daily. Disp:  Rfl:    lisinopril (PRINIVIL,ZESTRIL) 10 MG Oral Tab Take 10 mg by mouth daily.  Indications: High Blood Pres Imaging data reviewed in Epic. ASSESSMENT / PLAN:     1. S/p mechanical fall with right humeral fracture   1. Pain control with toradol as she is not tolerating norco due to hypotension.  Once BP improves can consider morphine IV for better pain contro

## 2018-02-15 NOTE — CM/SW NOTE
Pt admitted from [address] [place] [address]. She has a humerus fx. Per ortho, non operative treatment at this time. Met with pt and her dtr Sherrill Holstein in room. Per [facility] Solitario is pts residence and she has been there for 8 months.  Plan is for pt to return to [facility]

## 2018-02-15 NOTE — PLAN OF CARE
PAIN - ADULT    • Verbalizes/displays adequate comfort level or patient's stated pain goal Progressing        SAFETY ADULT - FALL    • Free from fall injury Progressing          Alert/oriented x4. Right arm with bruising and in shoulder immobilizer.  Needs

## 2018-02-16 ENCOUNTER — APPOINTMENT (OUTPATIENT)
Dept: ULTRASOUND IMAGING | Facility: HOSPITAL | Age: 83
DRG: 469 | End: 2018-02-16
Attending: HOSPITALIST
Payer: MEDICARE

## 2018-02-16 PROBLEM — N39.0 URINARY TRACT INFECTION WITHOUT HEMATURIA: Status: ACTIVE | Noted: 2018-02-16

## 2018-02-16 LAB
BUN BLD-MCNC: 23 MG/DL (ref 8–20)
CALCIUM BLD-MCNC: 7.8 MG/DL (ref 8.3–10.3)
CHLORIDE: 105 MMOL/L (ref 101–111)
CO2: 25 MMOL/L (ref 22–32)
CREAT BLD-MCNC: 0.57 MG/DL (ref 0.55–1.02)
ERYTHROCYTE [DISTWIDTH] IN BLOOD BY AUTOMATED COUNT: 14.1 % (ref 11.5–16)
GLUCOSE BLD-MCNC: 106 MG/DL (ref 70–99)
HCT VFR BLD AUTO: 23.6 % (ref 34–50)
HGB BLD-MCNC: 7.9 G/DL (ref 12–16)
MCH RBC QN AUTO: 30.4 PG (ref 27–33.2)
MCHC RBC AUTO-ENTMCNC: 33.5 G/DL (ref 31–37)
MCV RBC AUTO: 90.8 FL (ref 81–100)
PLATELET # BLD AUTO: 125 10(3)UL (ref 150–450)
POTASSIUM SERPL-SCNC: 4.2 MMOL/L (ref 3.6–5.1)
RBC # BLD AUTO: 2.6 X10(6)UL (ref 3.8–5.1)
RED CELL DISTRIBUTION WIDTH-SD: 46.9 FL (ref 35.1–46.3)
SODIUM SERPL-SCNC: 138 MMOL/L (ref 136–144)
WBC # BLD AUTO: 8.7 X10(3) UL (ref 4–13)

## 2018-02-16 PROCEDURE — 76882 US LMTD JT/FCL EVL NVASC XTR: CPT | Performed by: HOSPITALIST

## 2018-02-16 PROCEDURE — 99232 SBSQ HOSP IP/OBS MODERATE 35: CPT | Performed by: HOSPITALIST

## 2018-02-16 RX ORDER — SODIUM CHLORIDE 9 MG/ML
INJECTION, SOLUTION INTRAVENOUS ONCE
Status: COMPLETED | OUTPATIENT
Start: 2018-02-16 | End: 2018-02-16

## 2018-02-16 RX ORDER — DOCUSATE SODIUM 100 MG/1
100 CAPSULE, LIQUID FILLED ORAL 2 TIMES DAILY
Status: DISCONTINUED | OUTPATIENT
Start: 2018-02-16 | End: 2018-02-18

## 2018-02-16 RX ORDER — SODIUM CHLORIDE 9 MG/ML
INJECTION, SOLUTION INTRAVENOUS CONTINUOUS
Status: DISCONTINUED | OUTPATIENT
Start: 2018-02-16 | End: 2018-02-16

## 2018-02-16 NOTE — PHYSICAL THERAPY NOTE
Attempted to see pt today for PT session. Pt's Hgb 7.9, and when PCT attempting to transfer pt to [facility], the pt's HR was 130s and BP 95/63. Pt feeling lightheaded and nauseated. MD ordered US of R UE as the pt's R shoulder is more swollen today.  Will att

## 2018-02-16 NOTE — PLAN OF CARE
DISCHARGE PLANNING    • Discharge to home or other facility with appropriate resources Progressing        HEMATOLOGIC - ADULT    • Maintains hematologic stability Progressing    • Free from bleeding injury Progressing        Impaired Functional Mobility

## 2018-02-16 NOTE — PLAN OF CARE
Patient HR noted to increase to 120's-130's, was going to attempt to go to commode. Pt reporting feeling lightheaded. Informed pt to stay in bed for now. Dr. Meli Hunt notified, will give 1L 0.9NS at 100mL/hr and monitor. Will continue to monitor.

## 2018-02-16 NOTE — PROGRESS NOTES
[facility]  Progress Note     Gregory Lamb Patient Status:  Observation    1920 MRN [id number]   [facility] 3SW-A Attending Brad Lugo, [address] Day # 0 PCP Shivani Cole MD     Chief Complaint: Ludivina Tioga: Patient spray Alternating Nares Daily   • enoxaparin  30 mg Subcutaneous Daily       ASSESSMENT / PLAN:     1. S/p mechanical fall with right humeral fracture   1. Pain control with toradol as she is not tolerating norco due to hypotension.  Once BP improves can co

## 2018-02-17 ENCOUNTER — APPOINTMENT (OUTPATIENT)
Dept: GENERAL RADIOLOGY | Facility: HOSPITAL | Age: 83
DRG: 469 | End: 2018-02-17
Attending: ORTHOPAEDIC SURGERY
Payer: MEDICARE

## 2018-02-17 ENCOUNTER — APPOINTMENT (OUTPATIENT)
Dept: GENERAL RADIOLOGY | Facility: HOSPITAL | Age: 83
DRG: 469 | End: 2018-02-17
Attending: HOSPITALIST
Payer: MEDICARE

## 2018-02-17 ENCOUNTER — ANESTHESIA EVENT (OUTPATIENT)
Dept: SURGERY | Facility: HOSPITAL | Age: 83
End: 2018-02-17

## 2018-02-17 PROBLEM — I95.9 HYPOTENSION: Status: ACTIVE | Noted: 2018-02-14

## 2018-02-17 LAB
ERYTHROCYTE [DISTWIDTH] IN BLOOD BY AUTOMATED COUNT: 14.6 % (ref 11.5–16)
HCT VFR BLD AUTO: 21.2 % (ref 34–50)
HGB BLD-MCNC: 7.2 G/DL (ref 12–16)
INR BLD: 1.11 (ref 0.89–1.11)
MCH RBC QN AUTO: 30.8 PG (ref 27–33.2)
MCHC RBC AUTO-ENTMCNC: 34 G/DL (ref 31–37)
MCV RBC AUTO: 90.6 FL (ref 81–100)
PLATELET # BLD AUTO: 119 10(3)UL (ref 150–450)
PSA SERPL DL<=0.01 NG/ML-MCNC: 14.4 SECONDS (ref 12–14.3)
RBC # BLD AUTO: 2.34 X10(6)UL (ref 3.8–5.1)
RED CELL DISTRIBUTION WIDTH-SD: 48 FL (ref 35.1–46.3)
WBC # BLD AUTO: 7.2 X10(3) UL (ref 4–13)

## 2018-02-17 PROCEDURE — 99232 SBSQ HOSP IP/OBS MODERATE 35: CPT | Performed by: HOSPITALIST

## 2018-02-17 PROCEDURE — 71045 X-RAY EXAM CHEST 1 VIEW: CPT | Performed by: ORTHOPAEDIC SURGERY

## 2018-02-17 PROCEDURE — 73502 X-RAY EXAM HIP UNI 2-3 VIEWS: CPT | Performed by: HOSPITALIST

## 2018-02-17 RX ORDER — TRAMADOL HYDROCHLORIDE 50 MG/1
50 TABLET ORAL 4 TIMES DAILY PRN
Status: DISCONTINUED | OUTPATIENT
Start: 2018-02-17 | End: 2018-02-17

## 2018-02-17 RX ORDER — TRAMADOL HYDROCHLORIDE 50 MG/1
50 TABLET ORAL EVERY 4 HOURS PRN
Status: DISCONTINUED | OUTPATIENT
Start: 2018-02-17 | End: 2018-02-22

## 2018-02-17 NOTE — PROGRESS NOTES
[facility]PITAL[facility]  Progress Note     Dub Axon Patient Status:  Observation    1920 MRN [id number]   [facility] 3SW-A Attending Rafita Swan, [address] Day # 0 PCP José Chung MD     Chief Complaint: Anola Sober: Patient • cefTRIAXone  1 g Intravenous Q24H   • calcitonin (salmon)  1 spray Alternating Nares Daily       ASSESSMENT / PLAN:     1. S/p mechanical fall with right humeral fracture   1. Pain control with toradol as she is not tolerating norco due to hypotension.

## 2018-02-17 NOTE — PROGRESS NOTES
Spoke to Dr Kristine Dalton. [place] Aranda for pt to eat. He will discuss with Dr Susy Mendez and see pt tomorrow.

## 2018-02-17 NOTE — PHYSICAL THERAPY NOTE
Attempted to see pt. Off the floor for a hip/pelvic xray. Will cont to follow as appropriate and pending results of xray. Results of hip/pelvic xray today: Acute, displaced, angulated right femoral neck fracture. Pt placed on hold for inpt PT.   Wi

## 2018-02-17 NOTE — PROGRESS NOTES
Hip fracture binder given to pt and reviewed with pt, pt's two daughters, and pt's granddaughter. All questions answered.

## 2018-02-17 NOTE — ANESTHESIA PREPROCEDURE EVALUATION
PRE-OP EVALUATION    Patient Name: Arianne Marx    Pre-op Diagnosis: Closed right hip fracture ([address] Yenni Chatterjee    Procedure(s):  Right hip hemiarthroplasty/bipolar    Surgeon(s) and Role:     Carlos Silver MD - Primary    Pre-op vitals reviewed. (eight) hours as needed for Pain. Disp: 20 tablet Rfl: 0   docusate sodium 100 MG Oral Cap Take 100 mg by mouth 2 (two) times daily as needed for constipation.  Disp:  Rfl:    Calcium Carbonate Antacid 500 MG Oral Chew Tab Chew 1 tablet by mouth every 6 (si  02/16/2018   CO2 25.0 02/16/2018   BUN 23 (H) 02/16/2018   CREATSERUM 0.57 02/16/2018    (H) 02/16/2018   CA 7.8 (L) 02/16/2018            Airway      Mallampati: II       Cardiovascular    Cardiovascular exam normal.         Dental      Earmon Janusz

## 2018-02-18 ENCOUNTER — SURGERY (OUTPATIENT)
Age: 83
End: 2018-02-18

## 2018-02-18 ENCOUNTER — APPOINTMENT (OUTPATIENT)
Dept: GENERAL RADIOLOGY | Facility: HOSPITAL | Age: 83
DRG: 469 | End: 2018-02-18
Attending: ORTHOPAEDIC SURGERY
Payer: MEDICARE

## 2018-02-18 ENCOUNTER — ANESTHESIA (OUTPATIENT)
Dept: SURGERY | Facility: HOSPITAL | Age: 83
End: 2018-02-18

## 2018-02-18 PROBLEM — D64.9 ANEMIA: Status: ACTIVE | Noted: 2018-02-18

## 2018-02-18 PROBLEM — S72.001A HIP FRACTURE REQUIRING OPERATIVE REPAIR, RIGHT, CLOSED, INITIAL ENCOUNTER (HCC): Status: ACTIVE | Noted: 2018-02-18

## 2018-02-18 LAB
ANTIBODY SCREEN: NEGATIVE
ATRIAL RATE: 99 BPM
ERYTHROCYTE [DISTWIDTH] IN BLOOD BY AUTOMATED COUNT: 14.2 % (ref 11.5–16)
GLUCOSE BLD-MCNC: 260 MG/DL (ref 65–99)
HCT VFR BLD AUTO: 21.8 % (ref 34–50)
HGB BLD-MCNC: 7.4 G/DL (ref 12–16)
MCH RBC QN AUTO: 30.8 PG (ref 27–33.2)
MCHC RBC AUTO-ENTMCNC: 33.9 G/DL (ref 31–37)
MCV RBC AUTO: 90.8 FL (ref 81–100)
P AXIS: 48 DEGREES
P-R INTERVAL: 180 MS
PLATELET # BLD AUTO: 180 10(3)UL (ref 150–450)
Q-T INTERVAL: 326 MS
QRS DURATION: 74 MS
QTC CALCULATION (BEZET): 418 MS
R AXIS: -3 DEGREES
RBC # BLD AUTO: 2.4 X10(6)UL (ref 3.8–5.1)
RED CELL DISTRIBUTION WIDTH-SD: 47.3 FL (ref 35.1–46.3)
RH BLOOD TYPE: NEGATIVE
T AXIS: 16 DEGREES
VENTRICULAR RATE: 99 BPM
WBC # BLD AUTO: 8 X10(3) UL (ref 4–13)

## 2018-02-18 PROCEDURE — 30233N1 TRANSFUSION OF NONAUTOLOGOUS RED BLOOD CELLS INTO PERIPHERAL VEIN, PERCUTANEOUS APPROACH: ICD-10-PCS | Performed by: HOSPITALIST

## 2018-02-18 PROCEDURE — 99232 SBSQ HOSP IP/OBS MODERATE 35: CPT | Performed by: HOSPITALIST

## 2018-02-18 PROCEDURE — 0SRR0J9 REPLACEMENT OF RIGHT HIP JOINT, FEMORAL SURFACE WITH SYNTHETIC SUBSTITUTE, CEMENTED, OPEN APPROACH: ICD-10-PCS | Performed by: ORTHOPAEDIC SURGERY

## 2018-02-18 PROCEDURE — 73501 X-RAY EXAM HIP UNI 1 VIEW: CPT | Performed by: ORTHOPAEDIC SURGERY

## 2018-02-18 DEVICE — BIPOLAR LINER 44/45/46 ODX28MM: Type: IMPLANTABLE DEVICE | Site: HIP | Status: FUNCTIONAL

## 2018-02-18 DEVICE — CEMENT BONE ZIM PALACOS LVG: Type: IMPLANTABLE DEVICE | Site: HIP | Status: FUNCTIONAL

## 2018-02-18 DEVICE — 12/14 COCR FEM HEAD 28MM +0: Type: IMPLANTABLE DEVICE | Site: HIP | Status: FUNCTIONAL

## 2018-02-18 DEVICE — VERSYS CEM LD/FX SZ 14X135MM: Type: IMPLANTABLE DEVICE | Site: HIP | Status: FUNCTIONAL

## 2018-02-18 DEVICE — BIPOLAR SHELL 44MM OD: Type: IMPLANTABLE DEVICE | Site: HIP | Status: FUNCTIONAL

## 2018-02-18 DEVICE — VERSYS DISTAL CENTRALIZER 13MM: Type: IMPLANTABLE DEVICE | Site: HIP | Status: FUNCTIONAL

## 2018-02-18 RX ORDER — POLYETHYLENE GLYCOL 3350 17 G/17G
17 POWDER, FOR SOLUTION ORAL DAILY PRN
Status: DISCONTINUED | OUTPATIENT
Start: 2018-02-18 | End: 2018-02-22

## 2018-02-18 RX ORDER — MORPHINE SULFATE 2 MG/ML
2 INJECTION, SOLUTION INTRAMUSCULAR; INTRAVENOUS EVERY 2 HOUR PRN
Status: DISCONTINUED | OUTPATIENT
Start: 2018-02-18 | End: 2018-02-22

## 2018-02-18 RX ORDER — MORPHINE SULFATE 2 MG/ML
4 INJECTION, SOLUTION INTRAMUSCULAR; INTRAVENOUS EVERY 2 HOUR PRN
Status: DISCONTINUED | OUTPATIENT
Start: 2018-02-18 | End: 2018-02-22

## 2018-02-18 RX ORDER — DEXTROSE, SODIUM CHLORIDE, AND POTASSIUM CHLORIDE 5; .45; .15 G/100ML; G/100ML; G/100ML
INJECTION INTRAVENOUS CONTINUOUS
Status: DISCONTINUED | OUTPATIENT
Start: 2018-02-18 | End: 2018-02-18

## 2018-02-18 RX ORDER — CEFAZOLIN SODIUM/WATER 2 G/20 ML
2 SYRINGE (ML) INTRAVENOUS EVERY 8 HOURS
Status: COMPLETED | OUTPATIENT
Start: 2018-02-18 | End: 2018-02-19

## 2018-02-18 RX ORDER — ONDANSETRON 2 MG/ML
4 INJECTION INTRAMUSCULAR; INTRAVENOUS EVERY 4 HOURS PRN
Status: ACTIVE | OUTPATIENT
Start: 2018-02-18 | End: 2018-02-20

## 2018-02-18 RX ORDER — DIPHENHYDRAMINE HYDROCHLORIDE 50 MG/ML
25 INJECTION INTRAMUSCULAR; INTRAVENOUS ONCE AS NEEDED
Status: ACTIVE | OUTPATIENT
Start: 2018-02-18 | End: 2018-02-18

## 2018-02-18 RX ORDER — MEPERIDINE HYDROCHLORIDE 25 MG/ML
12.5 INJECTION INTRAMUSCULAR; INTRAVENOUS; SUBCUTANEOUS AS NEEDED
Status: DISCONTINUED | OUTPATIENT
Start: 2018-02-18 | End: 2018-02-18 | Stop reason: HOSPADM

## 2018-02-18 RX ORDER — ZOLPIDEM TARTRATE 5 MG/1
5 TABLET ORAL NIGHTLY PRN
Status: DISCONTINUED | OUTPATIENT
Start: 2018-02-18 | End: 2018-02-22

## 2018-02-18 RX ORDER — SENNOSIDES 8.6 MG
17.2 TABLET ORAL NIGHTLY
Status: DISCONTINUED | OUTPATIENT
Start: 2018-02-18 | End: 2018-02-22

## 2018-02-18 RX ORDER — DIPHENHYDRAMINE HCL 25 MG
25 CAPSULE ORAL EVERY 4 HOURS PRN
Status: DISCONTINUED | OUTPATIENT
Start: 2018-02-18 | End: 2018-02-22

## 2018-02-18 RX ORDER — DIPHENHYDRAMINE HYDROCHLORIDE 50 MG/ML
12.5 INJECTION INTRAMUSCULAR; INTRAVENOUS EVERY 4 HOURS PRN
Status: DISCONTINUED | OUTPATIENT
Start: 2018-02-18 | End: 2018-02-22

## 2018-02-18 RX ORDER — BISACODYL 10 MG
10 SUPPOSITORY, RECTAL RECTAL
Status: DISCONTINUED | OUTPATIENT
Start: 2018-02-18 | End: 2018-02-22

## 2018-02-18 RX ORDER — NALOXONE HYDROCHLORIDE 0.4 MG/ML
80 INJECTION, SOLUTION INTRAMUSCULAR; INTRAVENOUS; SUBCUTANEOUS AS NEEDED
Status: DISCONTINUED | OUTPATIENT
Start: 2018-02-18 | End: 2018-02-18 | Stop reason: HOSPADM

## 2018-02-18 RX ORDER — SODIUM CHLORIDE, SODIUM LACTATE, POTASSIUM CHLORIDE, CALCIUM CHLORIDE 600; 310; 30; 20 MG/100ML; MG/100ML; MG/100ML; MG/100ML
INJECTION, SOLUTION INTRAVENOUS CONTINUOUS
Status: DISCONTINUED | OUTPATIENT
Start: 2018-02-18 | End: 2018-02-18 | Stop reason: HOSPADM

## 2018-02-18 RX ORDER — DOCUSATE SODIUM 100 MG/1
100 CAPSULE, LIQUID FILLED ORAL 2 TIMES DAILY
Status: DISCONTINUED | OUTPATIENT
Start: 2018-02-18 | End: 2018-02-22

## 2018-02-18 RX ORDER — DEXTROSE, SODIUM CHLORIDE, AND POTASSIUM CHLORIDE 5; .45; .15 G/100ML; G/100ML; G/100ML
INJECTION INTRAVENOUS
Status: COMPLETED
Start: 2018-02-18 | End: 2018-02-18

## 2018-02-18 RX ORDER — DEXTROSE, SODIUM CHLORIDE, AND POTASSIUM CHLORIDE 5; .45; .15 G/100ML; G/100ML; G/100ML
INJECTION INTRAVENOUS CONTINUOUS
Status: DISCONTINUED | OUTPATIENT
Start: 2018-02-18 | End: 2018-02-21

## 2018-02-18 RX ORDER — METOCLOPRAMIDE HYDROCHLORIDE 5 MG/ML
10 INJECTION INTRAMUSCULAR; INTRAVENOUS EVERY 6 HOURS PRN
Status: DISCONTINUED | OUTPATIENT
Start: 2018-02-18 | End: 2018-02-18

## 2018-02-18 RX ORDER — SODIUM CHLORIDE 9 MG/ML
INJECTION, SOLUTION INTRAVENOUS CONTINUOUS
Status: DISCONTINUED | OUTPATIENT
Start: 2018-02-18 | End: 2018-02-21

## 2018-02-18 RX ORDER — SODIUM PHOSPHATE, DIBASIC AND SODIUM PHOSPHATE, MONOBASIC 7; 19 G/133ML; G/133ML
1 ENEMA RECTAL ONCE AS NEEDED
Status: DISCONTINUED | OUTPATIENT
Start: 2018-02-18 | End: 2018-02-22

## 2018-02-18 RX ORDER — METOCLOPRAMIDE HYDROCHLORIDE 5 MG/ML
5 INJECTION INTRAMUSCULAR; INTRAVENOUS EVERY 6 HOURS PRN
Status: ACTIVE | OUTPATIENT
Start: 2018-02-18 | End: 2018-02-20

## 2018-02-18 RX ORDER — ONDANSETRON 2 MG/ML
4 INJECTION INTRAMUSCULAR; INTRAVENOUS AS NEEDED
Status: DISCONTINUED | OUTPATIENT
Start: 2018-02-18 | End: 2018-02-18 | Stop reason: HOSPADM

## 2018-02-18 RX ORDER — CEFAZOLIN SODIUM 1 G/3ML
INJECTION, POWDER, FOR SOLUTION INTRAMUSCULAR; INTRAVENOUS
Status: DISCONTINUED | OUTPATIENT
Start: 2018-02-18 | End: 2018-02-18

## 2018-02-18 RX ORDER — METOCLOPRAMIDE HYDROCHLORIDE 5 MG/ML
10 INJECTION INTRAMUSCULAR; INTRAVENOUS AS NEEDED
Status: DISCONTINUED | OUTPATIENT
Start: 2018-02-18 | End: 2018-02-18 | Stop reason: HOSPADM

## 2018-02-18 RX ORDER — HYDROCODONE BITARTRATE AND ACETAMINOPHEN 10; 325 MG/1; MG/1
1 TABLET ORAL EVERY 4 HOURS PRN
Status: DISCONTINUED | OUTPATIENT
Start: 2018-02-18 | End: 2018-02-22

## 2018-02-18 NOTE — PLAN OF CARE
Patient/Family Goals    • Patient/Family Long Term Goal Not Progressing          DISCHARGE PLANNING    • Discharge to home or other facility with appropriate resources Progressing        HEMATOLOGIC - ADULT    • Maintains hematologic stability Progressing

## 2018-02-18 NOTE — PROGRESS NOTES
[facility]PITAL[facility]  Progress Note     Belkis Person Patient Status:  Observation    1920 MRN [id number]   [facility] 3SW-A Attending Porsha Mcfarland, [address] Day # 0 PCP Shari Gonzalez MD     Chief Complaint: Reena Stone: Patient Oral BID   • ceFAZolin  2 g Intravenous Q8H   • cefTRIAXone  1 g Intravenous Q24H   • calcitonin (salmon)  1 spray Alternating Nares Daily       ASSESSMENT / PLAN:     1. S/p mechanical fall with right humeral fracture   1.  Pain control with toradol as she

## 2018-02-18 NOTE — ANESTHESIA POSTPROCEDURE EVALUATION
Buddy Dia 373 Patient Status:  Observation   Age/Gender 80year old female MRN [id number]   [facility] SURGERY Attending JAMES Russell 21 Day # 0 PCP Elana Nava MD       Anesthesia Post-op Note    Procedu

## 2018-02-18 NOTE — PLAN OF CARE
HEMATOLOGIC - ADULT    • Maintains hematologic stability Progressing        Impaired Functional Mobility    • Achieve highest/safest level of mobility/gait Progressing        PAIN - ADULT    • Verbalizes/displays adequate comfort level or patient's stated

## 2018-02-18 NOTE — BRIEF OP NOTE
Pre-Operative Diagnosis: Closed right hip fracture (HCC) [S72.001A]     Post-Operative Diagnosis: * same     Procedure Performed:   Procedure(s):  Right hip hemiarthroplasty/bipolar    Surgeon(s) and Role:     Jennifer Jensen MD - Primary    Assista

## 2018-02-18 NOTE — PROGRESS NOTES
Pharmacy Note: Renal dose adjustment for Metaclopramide (Reglan)  Adelina Cabrera has been prescribed Metoclopramide (Reglan) 10 mg every 6 hours as needed. Estimated Creatinine Clearance: 38 mL/min (based on SCr of 0.57 mg/dL).     Her calculated creatin

## 2018-02-19 LAB
ERYTHROCYTE [DISTWIDTH] IN BLOOD BY AUTOMATED COUNT: 14.4 % (ref 11.5–16)
HCT VFR BLD AUTO: 23.4 % (ref 34–50)
HGB BLD-MCNC: 7.9 G/DL (ref 12–16)
MCH RBC QN AUTO: 30.5 PG (ref 27–33.2)
MCHC RBC AUTO-ENTMCNC: 33.8 G/DL (ref 31–37)
MCV RBC AUTO: 90.3 FL (ref 81–100)
PLATELET # BLD AUTO: 175 10(3)UL (ref 150–450)
RBC # BLD AUTO: 2.59 X10(6)UL (ref 3.8–5.1)
RED CELL DISTRIBUTION WIDTH-SD: 46.4 FL (ref 35.1–46.3)
WBC # BLD AUTO: 10.3 X10(3) UL (ref 4–13)

## 2018-02-19 PROCEDURE — 99232 SBSQ HOSP IP/OBS MODERATE 35: CPT | Performed by: HOSPITALIST

## 2018-02-19 NOTE — PHYSICAL THERAPY NOTE
PHYSICAL THERAPY EVALUATION - INPATIENT     Room Number: 356/356-A  Evaluation Date: 2/19/2018  Type of Evaluation: Re-evaluation  Physician Order: PT Eval and Treat    Presenting Problem: s/p right hemiarthroplasty 2/18/18  Reason for Therapy: [facility] sooner    OBJECTIVE  Precautions: MATTIE - posterior  Fall Risk: High fall risk    WEIGHT BEARING RESTRICTION  Weight Bearing Restriction: R upper extremity;R lower extremity  R Upper Extremity: Non-Weight Bearing     R Lower Extremity: Weight Bearing as Tole Standardized Score (AM-PAC Scale): 23.55   CMS Modifier (G-Code): CN    FUNCTIONAL ABILITY STATUS  Gait Assessment   Gait Assistance: Dependent assistance  Distance (ft): 4  Assistive Device: None  Pattern: Shuffle  Stoop/Curb Assistance: Not tested  Com Basic Mobility Short Form was completed and this patient is demonstrating a 100% degree of impairment in mobility. Research supports that patients with this level of impairment may benefit from DC to [facility].     Based on this evaluation, patient's clinical pre

## 2018-02-19 NOTE — PAYOR COMM NOTE
--------------  CONTINUED STAY REVIEW    PayorValery Grosser MEDICARE ADV PPO  Subscriber #:  [id number]  Authorization Number: N/A    Admit date: 2/18/18  Admit time: 2900    Admitting Physician: Carmenza Golden MD  Attending Physician:  Blossom Swanson* Date Action Dose Route User    2/19/2018 1134 New Bag 1 g Intravenous Marcin Keller, SHANNAN      docusate sodium (COLACE) cap 100 mg     Date Action Dose Route User    2/19/2018 0839 Given 100 mg Oral Marcin Keller RN    2/18/2018 2046 Given 100 mg PERFORMED:  Right femoral neck bipolar hemiarthroplasty.   2/19  WBAT  Post hip precautions  PT  DVT ppx  Pain control    PLEASE FAX DAYS CERTIFIED AND NEXT REVIEW DATE

## 2018-02-19 NOTE — PLAN OF CARE
PAIN - ADULT    • Verbalizes/displays adequate comfort level or patient's stated pain goal Progressing        Patient/Family Goals    • Patient/Family Long Term Goal Progressing    • Patient/Family Short Term Goal Progressing        Pt pain controlled with

## 2018-02-19 NOTE — PROGRESS NOTES
S/p bipolar hemiarthroplasty pod 1  Doing well  Recent Labs   Lab  02/17/18   0443  02/18/18   0441  02/19/18   0437   RBC  2.34*  2.40*  2.59*   HGB  7.2*  7.4*  7.9*   HCT  21.2*  21.8*  23.4*   MCV  90.6  90.8  90.3   MCH  30.8  30.8  30.5   MCHC  34.0

## 2018-02-19 NOTE — OPERATIVE REPORT
[facility]    PATIENT'S NAME: Marga Collins   ATTENDING PHYSICIAN: Radha Mendoza D.O.   OPERATING PHYSICIAN: Maria Isabel Williamson M.D.    PATIENT ACCOUNT#:   [de-identified]    LOCATION:  [address] [place] ROOMS [address] EDWP [address] [place] [address]. RECORD #:   [id number] whitley testing. We could get to 90 degrees flexion, almost 60 degrees adduction, and 50 degrees internal rotation. It was felt stable. We then irrigated the wound copiously and did our real implants, reduced the hip, and similar stability was found.   Goo

## 2018-02-19 NOTE — PROGRESS NOTES
[facility]ITAL[facility]  Progress Note     Gregory Lamb Patient Status:  Observation    1920 MRN [id number]   [facility] 3SW-A Attending Marybel Ortiz [address] Day # 1 PCP Shivani Cole MD     Chief Complaint: Ludivina Greenwood: Patient mechanical fall with right humeral fracture   1. Pain control with toradol as she is not tolerating norco due to hypotension. Once BP improves can consider morphine IV for better pain control   2. Ortho consult   3. PT OT   4. Fall precaution   2.  Rt femor

## 2018-02-19 NOTE — CM/SW NOTE
Informed by MD that pt likely ready for d/c to [facility] tomorrow. RANJAN spoke with Chel Garduno with [facility]'s [phone] re: Gael Zuleta to admit. Update sent via [address]. [facility] will need PT/OT evheraclio when completed. RANJAN will send when available.

## 2018-02-19 NOTE — PROGRESS NOTES
Pt c/o felling like she's \"burning up\". Very diaphoretic and clammy. HR sustaining in 120s for about 20 minutes. BP 97/62. Temp 98.1. O2 sat 100% on O2. Checked blood sugar-260 (pt recently finished dinner). Placed on tele-ST.  Pt also anxious because fam

## 2018-02-19 NOTE — OCCUPATIONAL THERAPY NOTE
OCCUPATIONAL THERAPY EVALUATION - INPATIENT     Room Number: 356/356-A  Evaluation Date: 2/19/2018  Type of Evaluation: Initial  Presenting Problem: fall w/R humerus fx and R femoral neck fx, s/p R hip pinning hemiarthroplasty/bipolar 2/18/18    Physician grooming, supervision for bathing, toileting, and UB/LB dressing, and required \"a little bit of help\" for toilet transfers, shower transfers, and bed mobility (to move BLE to/from edge of bed).  She reports functional mobility short distances via RW, long Toileting, which includes using toilet, bedpan or urinal? : Total  -   Putting on and taking off regular upper body clothing?: Total  -   Taking care of personal grooming such as brushing teeth?: A Lot  -   Eating meals?: A Lot    AM-PAC Score:  Score: 8 with blood pressure drop after transfer to chair, increased after chair reclined, RN aware. Patient End of Session: Up in chair;Needs met;Call light within reach;RN aware of session/findings; All patient questions and concerns addressed;SCDs in place; Jamar Ellis Complexity  Occupational Profile/Medical History MODERATE - Expanded review of history including review of medical or therapy record   Specific performance deficits impacting engagement in ADL/IADL MODERATE  3 - 5 performance deficits   Client Assessment/P

## 2018-02-20 LAB
BUN BLD-MCNC: 15 MG/DL (ref 8–20)
CALCIUM BLD-MCNC: 7.8 MG/DL (ref 8.3–10.3)
CHLORIDE: 100 MMOL/L (ref 101–111)
CO2: 26 MMOL/L (ref 22–32)
CREAT BLD-MCNC: 0.32 MG/DL (ref 0.55–1.02)
ERYTHROCYTE [DISTWIDTH] IN BLOOD BY AUTOMATED COUNT: 14.8 % (ref 11.5–16)
GLUCOSE BLD-MCNC: 88 MG/DL (ref 70–99)
HCT VFR BLD AUTO: 20.6 % (ref 34–50)
HGB BLD-MCNC: 6.8 G/DL (ref 12–16)
MCH RBC QN AUTO: 30.2 PG (ref 27–33.2)
MCHC RBC AUTO-ENTMCNC: 33 G/DL (ref 31–37)
MCV RBC AUTO: 91.6 FL (ref 81–100)
PLATELET # BLD AUTO: 186 10(3)UL (ref 150–450)
POTASSIUM SERPL-SCNC: 4.1 MMOL/L (ref 3.6–5.1)
RBC # BLD AUTO: 2.25 X10(6)UL (ref 3.8–5.1)
RED CELL DISTRIBUTION WIDTH-SD: 48.9 FL (ref 35.1–46.3)
SODIUM SERPL-SCNC: 132 MMOL/L (ref 136–144)
WBC # BLD AUTO: 10.3 X10(3) UL (ref 4–13)

## 2018-02-20 PROCEDURE — 99232 SBSQ HOSP IP/OBS MODERATE 35: CPT | Performed by: HOSPITALIST

## 2018-02-20 PROCEDURE — 30233H1 TRANSFUSION OF NONAUTOLOGOUS WHOLE BLOOD INTO PERIPHERAL VEIN, PERCUTANEOUS APPROACH: ICD-10-PCS | Performed by: HOSPITALIST

## 2018-02-20 RX ORDER — SODIUM CHLORIDE 9 MG/ML
INJECTION, SOLUTION INTRAVENOUS ONCE
Status: COMPLETED | OUTPATIENT
Start: 2018-02-20 | End: 2018-02-20

## 2018-02-20 RX ORDER — SODIUM CHLORIDE 9 MG/ML
INJECTION, SOLUTION INTRAVENOUS ONCE
Status: DISCONTINUED | OUTPATIENT
Start: 2018-02-20 | End: 2018-02-21

## 2018-02-20 NOTE — PLAN OF CARE
DISCHARGE PLANNING    • Discharge to home or other facility with appropriate resources Progressing        HEMATOLOGIC - ADULT    • Maintains hematologic stability Progressing    • Free from bleeding injury Progressing        Impaired Activities of Daily Clarita Romance

## 2018-02-20 NOTE — PLAN OF CARE
DISCHARGE PLANNING    • Discharge to home or other facility with appropriate resources Progressing        HEMATOLOGIC - ADULT    • Maintains hematologic stability Progressing    • Free from bleeding injury Progressing        Impaired Activities of Daily Isaias Call

## 2018-02-20 NOTE — OCCUPATIONAL THERAPY NOTE
Attempted to see patient for OT services this am, however will hold at this time due to hgb 6.8, patient receiving blood transfusion. Addressed questions from family at bedside regarding OT role and plan of care.  Will reattempt as able once patient appropr

## 2018-02-20 NOTE — PLAN OF CARE
Blood transfusion slightly delayed due to 2 iv sites leaking, ivs taken out and new one inserted in left forearm, blood started around 0850

## 2018-02-20 NOTE — PROGRESS NOTES
[facility]  Progress Note     Consuello Height Patient Status:  Observation    1920 MRN [id number]   [facility] 3SW-A Attending [facility] Day # 2 PCP Monica Israel MD     Chief Complaint:  Fake: Patient Alternating Nares Daily       ASSESSMENT / PLAN:     1. S/p mechanical fall with right humeral fracture   1. Pain control with toradol as she is not tolerating norco due to hypotension. Once BP improves can consider morphine IV for better pain control   2.

## 2018-02-20 NOTE — PROGRESS NOTES
S/p bipolar hemiarthroplasty pod 2  Doing well  Recent Labs   Lab  02/18/18   0441  02/19/18   0437  02/20/18   0508   RBC  2.40*  2.59*  2.25*   HGB  7.4*  7.9*  6.8*   HCT  21.8*  23.4*  20.6*   MCV  90.8  90.3  91.6   MCH  30.8  30.5  30.2   MCHC  33.9

## 2018-02-20 NOTE — PHYSICAL THERAPY NOTE
Chart reviewed, Pt's hgb is 6.8 this AM. Attempted to see patient for PT, f/u with RN. Per RN, Philipp Cat, please hold PT at this time since RN is going to start blood transfusion for patient as per MD orders at this time. Will f/u later for PT as appropriate.

## 2018-02-21 LAB
BLOOD TYPE BARCODE: 600
ERYTHROCYTE [DISTWIDTH] IN BLOOD BY AUTOMATED COUNT: 15.1 % (ref 11.5–16)
HCT VFR BLD AUTO: 26 % (ref 34–50)
HGB BLD-MCNC: 8.9 G/DL (ref 12–16)
MCH RBC QN AUTO: 29.4 PG (ref 27–33.2)
MCHC RBC AUTO-ENTMCNC: 34.2 G/DL (ref 31–37)
MCV RBC AUTO: 85.8 FL (ref 81–100)
PLATELET # BLD AUTO: 242 10(3)UL (ref 150–450)
RBC # BLD AUTO: 3.03 X10(6)UL (ref 3.8–5.1)
RED CELL DISTRIBUTION WIDTH-SD: 46 FL (ref 35.1–46.3)
WBC # BLD AUTO: 12.1 X10(3) UL (ref 4–13)

## 2018-02-21 PROCEDURE — 99232 SBSQ HOSP IP/OBS MODERATE 35: CPT | Performed by: HOSPITALIST

## 2018-02-21 RX ORDER — TRAMADOL HYDROCHLORIDE 50 MG/1
50 TABLET ORAL EVERY 8 HOURS PRN
Qty: 60 TABLET | Refills: 0 | Status: SHIPPED | OUTPATIENT
Start: 2018-02-21

## 2018-02-21 NOTE — CM/SW NOTE
Informed by MD that pt is medically cleared for d/c -family concerned about d/c today and indicated that they have not spoken with anyone. MD indicates to plan for d/c back to [facility] tomorrow. RANJAN spoke with Bisi Mtz at [facility].   Fa

## 2018-02-21 NOTE — PROGRESS NOTES
[facility]  Progress Note     Calleen Dancer Patient Status:  Observation    1920 MRN [id number]   [facility] 3SW-A [facility] PCP Elizabeth Cadena MD     Chief Complaint: Velvet Givens: Patient 1. Pain is adequately controlled with ultram  2. Ortho consult   3. PT OT   4. Fall precaution   2. Rt femoral neck fracture- S/P Right hip hemiarthroplasty, continue with PT/OT   3. Left shoulder hematoma  4. Hypotension:  Resolved  5.  Leukocytosis:  Re

## 2018-02-21 NOTE — PROGRESS NOTES
[facility]  Progress Note    Jacinta Moreno Patient Status:  Inpatient    1920 MRN [id number]   [facility] 3SW-A Attending Shannan Tenorio MD   Hosp Day # 3 PCP Bonita Chin MD     SUBJECTIVE:  INTERVAL HISTORY: S/P  3  Procedure(

## 2018-02-21 NOTE — PHYSICAL THERAPY NOTE
PHYSICAL THERAPY TREATMENT NOTE - INPATIENT    Room Number: 356/356-A     Session: 1   Number of Visits to Meet Established Goals: 5    Presenting Problem: s/p right hemiarthroplasty 2/18/18     History related to current admission: Patient is 80 years ol air    AM-PAC '6-Clicks' INPATIENT SHORT FORM - BASIC MOBILITY  How much difficulty does the patient currently have. ..  -   Turning over in bed (including adjusting bedclothes, sheets and blankets)?: A Lot   -   Sitting down on and standing up from a chair Position Sitting and supine     Repetitions   10   Sets   1     Patient End of Session: Up in chair;Needs met;Call light within reach;SCDs in place; Ice applied    ASSESSMENT   Patient is a 80year old female admitted on 2/14/2018 for  S/p Fall - Right Hu

## 2018-02-21 NOTE — OCCUPATIONAL THERAPY NOTE
OCCUPATIONAL THERAPY TREATMENT NOTE - INPATIENT     Room Number: 356/356-A  Session: 1   Number of Visits to Meet Established Goals: 5    Presenting Problem: fall w/R humerus fx and R femoral neck fx, s/p R hip pinning hemiarthroplasty/bipolar 2/18/18    H ACTIVITY TOLERANCE  O2 Saturation: 96%  Room air  No shortness of breath    ACTIVITIES OF DAILY LIVING ASSESSMENT  AM-PAC ‘6-Clicks’ Inpatient Daily Activity Short Form  How much help from another person does the patient currently need…  -   Putting on change; return to supine via mod assist x2. Patient also reinforced on OT role, safety, fall prevention, pain management with good verbal understanding. Increased time required for all tasks due to fatigue and increased pain with movement.   Patient End of

## 2018-02-21 NOTE — CM/SW NOTE
Contacted pt's dtr/HCPOA, Theresa, again re: d/c planning. She agrees with plan for d/c to 's tomorrow at 1PM.  Discussed ambulance transport. Updated Nils Conley with 's [phone] re: above. Facility ready to accept.   [facility]

## 2018-02-21 NOTE — PLAN OF CARE
HEMATOLOGIC - ADULT    • Maintains hematologic stability Progressing        PAIN - ADULT    • Verbalizes/displays adequate comfort level or patient's stated pain goal Progressing        Pt HGB 8.9 today. Voiding without problem, good appetite.   Pain manag

## 2018-02-22 VITALS
DIASTOLIC BLOOD PRESSURE: 68 MMHG | WEIGHT: 94.13 LBS | OXYGEN SATURATION: 94 % | HEART RATE: 90 BPM | SYSTOLIC BLOOD PRESSURE: 121 MMHG | RESPIRATION RATE: 19 BRPM | TEMPERATURE: 98 F | BODY MASS INDEX: 17 KG/M2

## 2018-02-22 PROCEDURE — 99239 HOSP IP/OBS DSCHRG MGMT >30: CPT | Performed by: HOSPITALIST

## 2018-02-22 NOTE — DISCHARGE SUMMARY
[facility]  DISCHARGE SUMMARY     Oly Morales Patient Status:  Inpatient    1920 MRN [id number]   [facility] 3SW-A Attending No att. providers found   Hosp Day # 4 PCP Shanta Conklin MD     Date of Admission: 2018  Date continued physical therapy. She was also placed on Eliquis for DVT prophylaxis. Her blood pressure meds were held secondary to initial hypotension which is now stabilized. She was additionally treated for a UTI with antibiotics.     Procedures during hos 50 MG Tabs  Commonly known as:  ULTRAM      Take 1 tablet (50 mg total) by mouth every 8 (eight) hours as needed for Pain.    Quantity:  60 tablet  Refills:  0        STOP taking these medications    lisinopril 10 MG Tabs  Commonly known as:  PRINIVIL,ZESTR

## 2018-02-22 NOTE — PROGRESS NOTES
[facility]  Progress Note     Zia Packer Patient Status:  Observation    1920 MRN [id number]   [facility] 3SW-A [facility] Day # 4 PCP Chary Lorenzo MD     Chief Complaint: Kathia Cabrera: Patient 4. Fall precaution   2. Rt femoral neck fracture- S/P Right hip hemiarthroplasty, continue with PT/OT   3. Left shoulder hematoma  4. Hypotension:  Resolved  5. Leukocytosis:  Resolved  6. Normocytic anemia  7. HTN:  BP meds held  8.  UTI:  DC abx    Plan

## 2018-02-22 NOTE — PHYSICAL THERAPY NOTE
PHYSICAL THERAPY TREATMENT NOTE - INPATIENT    Room Number: 356/356-A     Session: 2   Number of Visits to Meet Established Goals: 5    Presenting Problem: s/p right hemiarthroplasty 2/18/18     History related to current admission: Patient is 80 years ol air    AM-PAC '6-Clicks' INPATIENT SHORT FORM - BASIC MOBILITY  How much difficulty does the patient currently have. ..  -   Turning over in bed (including adjusting bedclothes, sheets and blankets)?: A Lot   -   Sitting down on and standing up from a chair slides  Quad sets  SAQ     Upper Extremity  - open/close     Position Sitting and supine     Repetitions   10   Sets   1     Patient End of Session: Up in chair;Needs met;Call light within reach;SCDs in place; Ice applied    ASSESSMENT   Patient is a 80

## 2018-02-26 NOTE — CM/SW NOTE
02/26/18 0800   Discharge disposition   Discharged to: Skilled Nurs   Name of [id number] [address]   Patient is Discharged to a [address] [place] [address] [place]   Discharge transportation [employer]

## 2018-03-19 PROBLEM — M25.511 ACUTE PAIN OF RIGHT SHOULDER: Status: ACTIVE | Noted: 2018-03-19

## 2019-03-01 VITALS
BODY MASS INDEX: 18.22 KG/M2 | WEIGHT: 99 LBS | HEART RATE: 88 BPM | SYSTOLIC BLOOD PRESSURE: 138 MMHG | HEIGHT: 62 IN | DIASTOLIC BLOOD PRESSURE: 78 MMHG

## 2019-07-10 ENCOUNTER — HOSPITAL ENCOUNTER (EMERGENCY)
Facility: HOSPITAL | Age: 84
Discharge: HOME | End: 2019-07-10
Attending: EMERGENCY MEDICINE
Payer: MEDICARE

## 2019-07-10 VITALS
RESPIRATION RATE: 18 BRPM | SYSTOLIC BLOOD PRESSURE: 159 MMHG | OXYGEN SATURATION: 95 % | HEIGHT: 61 IN | WEIGHT: 92 LBS | DIASTOLIC BLOOD PRESSURE: 88 MMHG | TEMPERATURE: 97 F | HEART RATE: 74 BPM | BODY MASS INDEX: 17.37 KG/M2

## 2019-07-10 DIAGNOSIS — R23.8 VESICULAR RASH: Primary | ICD-10-CM

## 2019-07-10 LAB
ALBUMIN SERPL-MCNC: 3.6 G/DL (ref 3.4–5)
ALBUMIN/GLOB SERPL: 1 {RATIO} (ref 1–2)
ALP LIVER SERPL-CCNC: 66 U/L (ref 55–142)
ALT SERPL-CCNC: 18 U/L (ref 13–56)
ANION GAP SERPL CALC-SCNC: 5 MMOL/L (ref 0–18)
AST SERPL-CCNC: 24 U/L (ref 15–37)
BASOPHILS # BLD AUTO: 0.02 X10(3) UL (ref 0–0.2)
BASOPHILS NFR BLD AUTO: 0.3 %
BILIRUB SERPL-MCNC: 0.4 MG/DL (ref 0.1–2)
BUN BLD-MCNC: 9 MG/DL (ref 7–18)
BUN/CREAT SERPL: 18.8 (ref 10–20)
CALCIUM BLD-MCNC: 8.8 MG/DL (ref 8.5–10.1)
CHLORIDE SERPL-SCNC: 99 MMOL/L (ref 98–112)
CO2 SERPL-SCNC: 30 MMOL/L (ref 21–32)
CREAT BLD-MCNC: 0.48 MG/DL (ref 0.55–1.02)
CRP SERPL-MCNC: <0.29 MG/DL (ref ?–0.3)
DEPRECATED RDW RBC AUTO: 46.6 FL (ref 35.1–46.3)
EOSINOPHIL # BLD AUTO: 0 X10(3) UL (ref 0–0.7)
EOSINOPHIL NFR BLD AUTO: 0 %
ERYTHROCYTE [DISTWIDTH] IN BLOOD BY AUTOMATED COUNT: 13.7 % (ref 11–15)
GLOBULIN PLAS-MCNC: 3.5 G/DL (ref 2.8–4.4)
GLUCOSE BLD-MCNC: 131 MG/DL (ref 70–99)
HCT VFR BLD AUTO: 43.1 % (ref 35–48)
HGB BLD-MCNC: 14.5 G/DL (ref 12–16)
IMM GRANULOCYTES # BLD AUTO: 0.02 X10(3) UL (ref 0–1)
IMM GRANULOCYTES NFR BLD: 0.3 %
LYMPHOCYTES # BLD AUTO: 0.66 X10(3) UL (ref 1–4)
LYMPHOCYTES NFR BLD AUTO: 9.3 %
M PROTEIN MFR SERPL ELPH: 7.1 G/DL (ref 6.4–8.2)
MCH RBC QN AUTO: 31 PG (ref 26–34)
MCHC RBC AUTO-ENTMCNC: 33.6 G/DL (ref 31–37)
MCV RBC AUTO: 92.1 FL (ref 80–100)
MONOCYTES # BLD AUTO: 0.12 X10(3) UL (ref 0.1–1)
MONOCYTES NFR BLD AUTO: 1.7 %
NEUTROPHILS # BLD AUTO: 6.29 X10 (3) UL (ref 1.5–7.7)
NEUTROPHILS # BLD AUTO: 6.29 X10(3) UL (ref 1.5–7.7)
NEUTROPHILS NFR BLD AUTO: 88.4 %
OSMOLALITY SERPL CALC.SUM OF ELEC: 278 MOSM/KG (ref 275–295)
PLATELET # BLD AUTO: 191 10(3)UL (ref 150–450)
POTASSIUM SERPL-SCNC: 4 MMOL/L (ref 3.5–5.1)
RBC # BLD AUTO: 4.68 X10(6)UL (ref 3.8–5.3)
SED RATE-ML: 8 MM/HR (ref 0–25)
SODIUM SERPL-SCNC: 134 MMOL/L (ref 136–145)
WBC # BLD AUTO: 7.1 X10(3) UL (ref 4–11)

## 2019-07-10 PROCEDURE — 86140 C-REACTIVE PROTEIN: CPT | Performed by: PHYSICIAN ASSISTANT

## 2019-07-10 PROCEDURE — 99283 EMERGENCY DEPT VISIT LOW MDM: CPT

## 2019-07-10 PROCEDURE — 85025 COMPLETE CBC W/AUTO DIFF WBC: CPT | Performed by: PHYSICIAN ASSISTANT

## 2019-07-10 PROCEDURE — 85652 RBC SED RATE AUTOMATED: CPT | Performed by: PHYSICIAN ASSISTANT

## 2019-07-10 PROCEDURE — 36415 COLL VENOUS BLD VENIPUNCTURE: CPT

## 2019-07-10 PROCEDURE — 86038 ANTINUCLEAR ANTIBODIES: CPT | Performed by: PHYSICIAN ASSISTANT

## 2019-07-10 PROCEDURE — 99284 EMERGENCY DEPT VISIT MOD MDM: CPT

## 2019-07-10 PROCEDURE — 80053 COMPREHEN METABOLIC PANEL: CPT | Performed by: PHYSICIAN ASSISTANT

## 2019-07-10 RX ORDER — PREDNISONE 20 MG/1
20 TABLET ORAL DAILY
Status: ON HOLD | COMMUNITY
End: 2019-07-18

## 2019-07-10 RX ORDER — POLYETHYLENE GLYCOL 3350 17 G/17G
17 POWDER, FOR SOLUTION ORAL DAILY
COMMUNITY

## 2019-07-10 RX ORDER — DIPHENHYDRAMINE HCL 25 MG
25 CAPSULE ORAL DAILY
COMMUNITY

## 2019-07-10 RX ORDER — BISACODYL 10 MG
10 SUPPOSITORY, RECTAL RECTAL
COMMUNITY

## 2019-07-10 RX ORDER — MOMETASONE FUROATE 1 MG/G
CREAM TOPICAL DAILY
COMMUNITY

## 2019-07-10 RX ORDER — MULTIVIT-MIN/IRON FUM/FOLIC AC 7.5 MG-4
1 TABLET ORAL DAILY
COMMUNITY

## 2019-07-10 RX ORDER — OMEPRAZOLE 20 MG/1
20 CAPSULE, DELAYED RELEASE ORAL
COMMUNITY

## 2019-07-10 NOTE — ED PROVIDER NOTES
Patient Seen in: [facility] Emergency Department    History   Patient presents with:  Rash Skin Problem (integumentary)    Stated Complaint: rash, shingles    HPI  Patient is a 75-year-old female sent from the nursing home for further evaluation of Pulse 83   Temp 97.4 °F (36.3 °C) (Oral)   Resp 18   Ht 154.9 cm (5' 1\")   Wt 41.7 kg   SpO2 95%   BMI 17.38 kg/m²         Physical Exam    Gen: Well appearing, well groomed, alert and aware x 3  Neck: Supple, full range of motion  Eye examination: EOMs a the nape and upper buttock/groin region. Lesions are in varying stages of inflammation. Patient currently on 40 mg of prednisone, yesterday and today.     Discussed case with on-call dermatology, Dr. Madai Lara and physician assistant    CBC, CMP, ESR, CR

## 2019-07-10 NOTE — ED INITIAL ASSESSMENT (HPI)
Pt sent from [facility] resident for worsening rash, possible r/o shingles. Pt states began yesterday after pt came back from [facility] with family.  Pt denies sonia,keira,waldemar .

## 2019-07-10 NOTE — ED PROVIDER NOTES
I reviewed that chart and discussed the case. I have examined the patient and noted vesicular/bullous rash noted on patient's right arm, trunk/groin area. See photos above. Patient is nontoxic-appearing.   Case was discussed with

## 2019-07-11 ENCOUNTER — LAB ENCOUNTER (OUTPATIENT)
Dept: LAB | Age: 84
End: 2019-07-11
Attending: PHYSICIAN ASSISTANT
Payer: MEDICARE

## 2019-07-11 DIAGNOSIS — R21 RASH: ICD-10-CM

## 2019-07-11 PROCEDURE — 87798 DETECT AGENT NOS DNA AMP: CPT

## 2019-07-11 PROCEDURE — G0452 MOLECULAR PATHOLOGY INTERPR: HCPCS

## 2019-07-12 LAB
ANA SCREEN: NEGATIVE
HSV1 DNA SPEC QL NAA+PROBE: NEGATIVE
HSV2 DNA SPEC QL NAA+PROBE: NEGATIVE

## 2019-07-15 NOTE — PROGRESS NOTES
Pt currently being treated for HSV d/t pathology results and clinical presentation. No change to treatment plan at this time.

## 2019-07-17 ENCOUNTER — HOSPITAL ENCOUNTER (INPATIENT)
Facility: HOSPITAL | Age: 84
LOS: 2 days | Discharge: NURSING, REHAB OR LONG-TERM CARE | DRG: 866 | End: 2019-07-19
Attending: EMERGENCY MEDICINE | Admitting: INTERNAL MEDICINE
Payer: MEDICARE

## 2019-07-17 DIAGNOSIS — B02.7 DISSEMINATED HERPES ZOSTER: Primary | ICD-10-CM

## 2019-07-17 LAB
ALBUMIN SERPL-MCNC: 2.7 G/DL (ref 3.4–5)
ALBUMIN/GLOB SERPL: 0.8 {RATIO} (ref 1–2)
ALP LIVER SERPL-CCNC: 57 U/L (ref 55–142)
ALT SERPL-CCNC: 14 U/L (ref 13–56)
ANION GAP SERPL CALC-SCNC: 4 MMOL/L (ref 0–18)
AST SERPL-CCNC: 23 U/L (ref 15–37)
BASOPHILS # BLD AUTO: 0.06 X10(3) UL (ref 0–0.2)
BASOPHILS NFR BLD AUTO: 0.7 %
BILIRUB SERPL-MCNC: 0.5 MG/DL (ref 0.1–2)
BUN BLD-MCNC: 8 MG/DL (ref 7–18)
BUN/CREAT SERPL: 15.7 (ref 10–20)
CALCIUM BLD-MCNC: 8.4 MG/DL (ref 8.5–10.1)
CHLORIDE SERPL-SCNC: 97 MMOL/L (ref 98–112)
CO2 SERPL-SCNC: 29 MMOL/L (ref 21–32)
CREAT BLD-MCNC: 0.51 MG/DL (ref 0.55–1.02)
DEPRECATED RDW RBC AUTO: 43.5 FL (ref 35.1–46.3)
EOSINOPHIL # BLD AUTO: 0.08 X10(3) UL (ref 0–0.7)
EOSINOPHIL NFR BLD AUTO: 1 %
ERYTHROCYTE [DISTWIDTH] IN BLOOD BY AUTOMATED COUNT: 13.2 % (ref 11–15)
GLOBULIN PLAS-MCNC: 3.3 G/DL (ref 2.8–4.4)
GLUCOSE BLD-MCNC: 95 MG/DL (ref 70–99)
HCT VFR BLD AUTO: 37.5 % (ref 35–48)
HGB BLD-MCNC: 12.8 G/DL (ref 12–16)
IMM GRANULOCYTES # BLD AUTO: 0.02 X10(3) UL (ref 0–1)
IMM GRANULOCYTES NFR BLD: 0.2 %
LYMPHOCYTES # BLD AUTO: 2.12 X10(3) UL (ref 1–4)
LYMPHOCYTES NFR BLD AUTO: 26.4 %
M PROTEIN MFR SERPL ELPH: 6 G/DL (ref 6.4–8.2)
MCH RBC QN AUTO: 30.6 PG (ref 26–34)
MCHC RBC AUTO-ENTMCNC: 34.1 G/DL (ref 31–37)
MCV RBC AUTO: 89.7 FL (ref 80–100)
MONOCYTES # BLD AUTO: 0.71 X10(3) UL (ref 0.1–1)
MONOCYTES NFR BLD AUTO: 8.8 %
NEUTROPHILS # BLD AUTO: 5.05 X10 (3) UL (ref 1.5–7.7)
NEUTROPHILS # BLD AUTO: 5.05 X10(3) UL (ref 1.5–7.7)
NEUTROPHILS NFR BLD AUTO: 62.9 %
OSMOLALITY SERPL CALC.SUM OF ELEC: 268 MOSM/KG (ref 275–295)
PLATELET # BLD AUTO: 239 10(3)UL (ref 150–450)
POTASSIUM SERPL-SCNC: 4 MMOL/L (ref 3.5–5.1)
RBC # BLD AUTO: 4.18 X10(6)UL (ref 3.8–5.3)
SODIUM SERPL-SCNC: 130 MMOL/L (ref 136–145)
WBC # BLD AUTO: 8 X10(3) UL (ref 4–11)

## 2019-07-17 PROCEDURE — 99285 EMERGENCY DEPT VISIT HI MDM: CPT

## 2019-07-17 PROCEDURE — 36415 COLL VENOUS BLD VENIPUNCTURE: CPT

## 2019-07-17 PROCEDURE — 80053 COMPREHEN METABOLIC PANEL: CPT | Performed by: NURSE PRACTITIONER

## 2019-07-17 PROCEDURE — 85025 COMPLETE CBC W/AUTO DIFF WBC: CPT | Performed by: NURSE PRACTITIONER

## 2019-07-17 RX ORDER — DOCUSATE SODIUM 100 MG/1
100 CAPSULE, LIQUID FILLED ORAL 2 TIMES DAILY PRN
Status: DISCONTINUED | OUTPATIENT
Start: 2019-07-17 | End: 2019-07-19

## 2019-07-17 RX ORDER — PREDNISONE 20 MG/1
20 TABLET ORAL DAILY
Status: DISCONTINUED | OUTPATIENT
Start: 2019-07-18 | End: 2019-07-18

## 2019-07-17 RX ORDER — CALCIUM CARBONATE 200(500)MG
500 TABLET,CHEWABLE ORAL EVERY 6 HOURS PRN
Status: DISCONTINUED | OUTPATIENT
Start: 2019-07-17 | End: 2019-07-19

## 2019-07-17 RX ORDER — PANTOPRAZOLE SODIUM 20 MG/1
20 TABLET, DELAYED RELEASE ORAL
Status: DISCONTINUED | OUTPATIENT
Start: 2019-07-18 | End: 2019-07-19

## 2019-07-17 RX ORDER — TRAMADOL HYDROCHLORIDE 50 MG/1
50 TABLET ORAL EVERY 6 HOURS PRN
Status: DISCONTINUED | OUTPATIENT
Start: 2019-07-17 | End: 2019-07-18 | Stop reason: DRUGHIGH

## 2019-07-17 RX ORDER — SODIUM CHLORIDE 9 MG/ML
INJECTION, SOLUTION INTRAVENOUS CONTINUOUS
Status: DISCONTINUED | OUTPATIENT
Start: 2019-07-17 | End: 2019-07-18

## 2019-07-17 RX ORDER — ACETAMINOPHEN, ASPIRIN AND CAFFEINE 250; 250; 65 MG/1; MG/1; MG/1
2 TABLET, FILM COATED ORAL DAILY PRN
Status: DISCONTINUED | OUTPATIENT
Start: 2019-07-17 | End: 2019-07-19

## 2019-07-17 RX ORDER — POLYETHYLENE GLYCOL 3350 17 G/17G
17 POWDER, FOR SOLUTION ORAL DAILY
Status: DISCONTINUED | OUTPATIENT
Start: 2019-07-18 | End: 2019-07-19

## 2019-07-17 RX ORDER — DIPHENHYDRAMINE HCL 25 MG
25 CAPSULE ORAL DAILY
Status: DISCONTINUED | OUTPATIENT
Start: 2019-07-18 | End: 2019-07-19

## 2019-07-17 RX ORDER — ACETAMINOPHEN 325 MG/1
650 TABLET ORAL EVERY 4 HOURS PRN
Status: DISCONTINUED | OUTPATIENT
Start: 2019-07-17 | End: 2019-07-18 | Stop reason: DRUGHIGH

## 2019-07-17 RX ORDER — MULTIPLE VITAMINS W/ MINERALS TAB 9MG-400MCG
1 TAB ORAL DAILY
Status: DISCONTINUED | OUTPATIENT
Start: 2019-07-17 | End: 2019-07-19

## 2019-07-17 RX ORDER — ALENDRONATE SODIUM 70 MG/1
70 TABLET ORAL WEEKLY
Status: DISCONTINUED | OUTPATIENT
Start: 2019-07-22 | End: 2019-07-19

## 2019-07-17 NOTE — ED INITIAL ASSESSMENT (HPI)
Pt present to ed from home with c/o open area to body related to shingles, sx present last 10 days. pt alert and talking, states pain is not that intense, pt has open area to l arm and l side of body.

## 2019-07-17 NOTE — ED NOTES
Pt present to ed from home with c/o blisters to arms and chest/ pt sx preent last 10 days,pt had work up with dermatologist, magnus Bailey , pt transfer to ed for med and lab work.

## 2019-07-18 PROCEDURE — 97530 THERAPEUTIC ACTIVITIES: CPT

## 2019-07-18 PROCEDURE — 97161 PT EVAL LOW COMPLEX 20 MIN: CPT

## 2019-07-18 RX ORDER — HEPARIN SODIUM 5000 [USP'U]/ML
5000 INJECTION, SOLUTION INTRAVENOUS; SUBCUTANEOUS EVERY 12 HOURS
Status: DISCONTINUED | OUTPATIENT
Start: 2019-07-18 | End: 2019-07-19

## 2019-07-18 RX ORDER — TRAMADOL HYDROCHLORIDE 50 MG/1
50 TABLET ORAL EVERY 8 HOURS PRN
Status: DISCONTINUED | OUTPATIENT
Start: 2019-07-18 | End: 2019-07-19

## 2019-07-18 RX ORDER — PREDNISONE 10 MG/1
10 TABLET ORAL DAILY
Status: DISCONTINUED | OUTPATIENT
Start: 2019-07-19 | End: 2019-07-19

## 2019-07-18 RX ORDER — PREDNISONE 10 MG/1
5 TABLET ORAL
Status: DISCONTINUED | OUTPATIENT
Start: 2019-07-23 | End: 2019-07-19

## 2019-07-18 RX ORDER — CALCITONIN SALMON 200 [IU]/.09ML
1 SPRAY, METERED NASAL DAILY
Status: DISCONTINUED | OUTPATIENT
Start: 2019-07-18 | End: 2019-07-19

## 2019-07-18 RX ORDER — ERYTHROMYCIN 5 MG/G
1 OINTMENT OPHTHALMIC 2 TIMES DAILY
Status: DISCONTINUED | OUTPATIENT
Start: 2019-07-18 | End: 2019-07-19

## 2019-07-18 RX ORDER — BISACODYL 10 MG
10 SUPPOSITORY, RECTAL RECTAL
Status: DISCONTINUED | OUTPATIENT
Start: 2019-07-18 | End: 2019-07-19

## 2019-07-18 RX ORDER — PREDNISONE 1 MG/1
5 TABLET ORAL
Qty: 3 TABLET | Refills: 0 | Status: SHIPPED | OUTPATIENT
Start: 2019-07-23 | End: 2019-07-26

## 2019-07-18 RX ORDER — PREDNISONE 10 MG/1
10 TABLET ORAL DAILY
Qty: 3 TABLET | Refills: 0 | Status: SHIPPED | OUTPATIENT
Start: 2019-07-19 | End: 2019-07-22

## 2019-07-18 RX ORDER — ACYCLOVIR 400 MG/1
400 TABLET ORAL EVERY 8 HOURS
Status: DISCONTINUED | OUTPATIENT
Start: 2019-07-18 | End: 2019-07-19

## 2019-07-18 RX ORDER — ACETAMINOPHEN 325 MG/1
650 TABLET ORAL EVERY 6 HOURS PRN
Status: DISCONTINUED | OUTPATIENT
Start: 2019-07-18 | End: 2019-07-19

## 2019-07-18 NOTE — PROGRESS NOTES
07/18/19 1351   Clinical Encounter Type   Visited With Patient and family together  (Daughter Malachi Coates at bedside)   Routine Visit   (REsponded to request for consult - POLST)    found POLST dated 8/12/17 signed by patient and authorized by pracdiony

## 2019-07-18 NOTE — PLAN OF CARE
Assumed care at 2100. Admitted from [facility] for shingles. A&O x2, forgetful at times disoriented to time and location. On Ra, . No tele. C/o wrist and arm pain, receives Tramadol PRN per MAR.    Rash appears on right arm and right hand, and ar unsuccessful or patient reports new pain  - Anticipate increased pain with activity and pre-medicate as appropriate  Outcome: Progressing

## 2019-07-18 NOTE — PLAN OF CARE
Assumed care of the patient @ 07:00, resting in bed. Alert x's 2-3, denies pain. IV patent. Tolerating diet, appetite fair. Vitals stable, on room air. Pt voiding freely, on bedpan, continent.  Airborne and contact isolation ok to be d'cd per Dr. Jamison Skyla waldemar increased pain with activity and pre-medicate as appropriate  Outcome: Progressing     Problem: RISK FOR INFECTION - ADULT  Goal: Absence of fever/infection during anticipated neutropenic period  Description  INTERVENTIONS  - Monitor WBC  - Administer grow

## 2019-07-18 NOTE — CONSULTS
INFECTIOUS DISEASE CONSULTATION    Belkis Person Patient Status:  Inpatient    1920 MRN [id number]   [facility] 3NE-A Attending Beba Ervin MD   [facility] Day # 1 PCP Teena Springer Right Eye, BID  •  Heparin Sodium (Porcine) 5000 UNIT/ML injection 5,000 Units, 5,000 Units, Subcutaneous, Q12H  •  acetaminophen (TYLENOL) tab 650 mg, 650 mg, Oral, Q6H PRN  •  [START ON 7/19/2019] predniSONE (DELTASONE) tab 10 mg, 10 mg, Oral, Daily  • Disp: 60 tablet Rfl: 0   calcitonin, salmon, 200 UNIT/ACT Nasal Solution 1 spray by Nasal route daily. Disp:  Rfl:    Calcium Carbonate Antacid 500 MG Oral Chew Tab Chew 1 tablet by mouth every 6 (six) hours as needed for Heartburn.  Disp:  Rfl:    acetamin nondistended. Positive bowel sounds. Musculoskeletal: Full range of motion of all extremities. No swelling noted. Joints: no effusions  Skin: multiple lesions all crusted      Laboratory Data:      Recent Labs   Lab 07/17/19  1808   RBC 4.18   HGB 12. 8

## 2019-07-18 NOTE — ED PROVIDER NOTES
Patient Seen in: [facility] 3ne-a    History   Patient presents with:  Pain (neurologic)    Stated Complaint: SHINGLES    22-year-old female who presents to the emergency room for direct admission, unable to direct admit this time due to no isolatio be half a glass of beer    Drug use: No      Review of Systems    Positive for stated complaint: SHINGLES  Other systems are as noted in HPI. Constitutional and vital signs reviewed. All other systems reviewed and negative except as noted above.     P (*)     Calcium, Total 8.4 (*)     Calculated Osmolality 268 (*)     Total Protein 6.0 (*)     Albumin 2.7 (*)     A/G Ratio 0.8 (*)     All other components within normal limits   CBC WITH DIFFERENTIAL WITH PLATELET    Narrative:      The following orders

## 2019-07-18 NOTE — PROGRESS NOTES
07/18/19 1346   Clinical Encounter Type   Visited With Patient and family together  (Patient's daughter Shilpa Grier at bedside)   Routine Visit   (Responded to request for communion)   Patient's Supportive Strategies/Resources  offered non anxious prese

## 2019-07-18 NOTE — PHYSICAL THERAPY NOTE
PHYSICAL THERAPY EVALUATION - INPATIENT     Room Number: 8790/9547-B  Evaluation Date: 7/18/2019  Type of Evaluation: Initial  Physician Order: PT Eval and Treat    Presenting Problem: disseminated herpes zosster  Reason for Therapy: Mobility Dysfunc with cues to redirect  · Orientation Level:  oriented to place, oriented to situation, oriented to person and Disoriented to the day  · Following Commands:  follows one step commands with increased time  · Initiation: appears intact  · Motor Pl[address] 76.75%   Standardized Score (AM-PAC Scale): 32.29   CMS Modifier (G-Code): CL    FUNCTIONAL ABILITY STATUS  Gait Assessment   Gait Assistance: Dependent assistance  Distance (ft): 3  Assistive Device: Rolling walker  Pattern: Shuffle  Stoop/Curb Assistance at [address] [facility] and with PTLavelle Vieira Based on this evaluation, patient's clinical presentation is stable and overall the evaluation complexity is considered low.   These impairments and comorbidities manifest themselves as functional limitations in independent

## 2019-07-18 NOTE — H&P
[facility]    PATIENT'S NAME: Carina Prasad   ATTENDING PHYSICIAN: Radhames Nguyen M.D.    PATIENT ACCOUNT#:   [de-identified]    LOCATION:  [address] [place] [address]  MEDICAL RECORD #:   [id number]       YOB: 1920  ADMISSION DATE:       07/17/2019 Generalized weakness. LABORATORY DATA:  BUN 8, creatinine 0.51. CBC essentially unremarkable. IMPRESSION AND PLAN:  Elderly female currently admitted with:    1. Disseminated shingles and IV acyclovir, as was discussed with Dr. Eli Avery on the phone.

## 2019-07-18 NOTE — PAYOR COMM NOTE
--------------  ADMISSION REVIEW     Payor: [employer] MED[employer]ARE ADV PPO  Subscriber #:  [id number]  Authorization Number: N/A    Admit date: 7/17/19  Admit time: 2031       Admitting Physician: Sana Malave MD  Attending Physician:  Sana Malave MD  [facility] the following components:       Result Value    Sodium 130 (*)     Chloride 97 (*)     Creatinine 0.51 (*)     Calcium, Total 8.4 (*)     Calculated Osmolality 268 (*)     Total Protein 6.0 (*)     Albumin 2.7 (*)     A/G Ratio 0.8 (*)     All other compon stable, sitting up, seen with daughter in the room.   Discussed at length with daughter about her recent condition, and she is in agreement with the current care plan.     PAST MEDICAL HISTORY:  History of osteoarthritis; osteoporosis; recently diagnosed sh 7/17/2019 2324 New Bag 400 mg Intravenous Navdeep Forde RN      diphenhydrAMINE (BENADRYL) cap/tab 25 mg     Date Action Dose Route User    7/18/2019 0915 Given 25 mg Oral Efe Hernandez RN      Pantoprazole Sodium (PROTONIX) EC tab 20 mg     Date Act

## 2019-07-19 VITALS
HEIGHT: 60 IN | RESPIRATION RATE: 18 BRPM | HEART RATE: 73 BPM | OXYGEN SATURATION: 98 % | SYSTOLIC BLOOD PRESSURE: 175 MMHG | WEIGHT: 89.81 LBS | DIASTOLIC BLOOD PRESSURE: 83 MMHG | TEMPERATURE: 99 F | BODY MASS INDEX: 17.63 KG/M2

## 2019-07-19 LAB
ALBUMIN SERPL-MCNC: 2.9 G/DL (ref 3.4–5)
ALBUMIN/GLOB SERPL: 0.8 {RATIO} (ref 1–2)
ALP LIVER SERPL-CCNC: 60 U/L (ref 55–142)
ALT SERPL-CCNC: 17 U/L (ref 13–56)
ANION GAP SERPL CALC-SCNC: 5 MMOL/L (ref 0–18)
AST SERPL-CCNC: 20 U/L (ref 15–37)
BASOPHILS # BLD AUTO: 0.06 X10(3) UL (ref 0–0.2)
BASOPHILS NFR BLD AUTO: 0.6 %
BILIRUB SERPL-MCNC: 0.6 MG/DL (ref 0.1–2)
BUN BLD-MCNC: 5 MG/DL (ref 7–18)
BUN/CREAT SERPL: 10.9 (ref 10–20)
CALCIUM BLD-MCNC: 8.7 MG/DL (ref 8.5–10.1)
CHLORIDE SERPL-SCNC: 98 MMOL/L (ref 98–112)
CO2 SERPL-SCNC: 30 MMOL/L (ref 21–32)
CREAT BLD-MCNC: 0.46 MG/DL (ref 0.55–1.02)
DEPRECATED RDW RBC AUTO: 42.5 FL (ref 35.1–46.3)
EOSINOPHIL # BLD AUTO: 0.06 X10(3) UL (ref 0–0.7)
EOSINOPHIL NFR BLD AUTO: 0.6 %
ERYTHROCYTE [DISTWIDTH] IN BLOOD BY AUTOMATED COUNT: 13 % (ref 11–15)
GLOBULIN PLAS-MCNC: 3.5 G/DL (ref 2.8–4.4)
GLUCOSE BLD-MCNC: 112 MG/DL (ref 70–99)
HCT VFR BLD AUTO: 38.9 % (ref 35–48)
HGB BLD-MCNC: 13.4 G/DL (ref 12–16)
IMM GRANULOCYTES # BLD AUTO: 0.03 X10(3) UL (ref 0–1)
IMM GRANULOCYTES NFR BLD: 0.3 %
LYMPHOCYTES # BLD AUTO: 2.32 X10(3) UL (ref 1–4)
LYMPHOCYTES NFR BLD AUTO: 24.8 %
M PROTEIN MFR SERPL ELPH: 6.4 G/DL (ref 6.4–8.2)
MCH RBC QN AUTO: 30.7 PG (ref 26–34)
MCHC RBC AUTO-ENTMCNC: 34.4 G/DL (ref 31–37)
MCV RBC AUTO: 89.2 FL (ref 80–100)
MONOCYTES # BLD AUTO: 0.85 X10(3) UL (ref 0.1–1)
MONOCYTES NFR BLD AUTO: 9.1 %
NEUTROPHILS # BLD AUTO: 6.02 X10 (3) UL (ref 1.5–7.7)
NEUTROPHILS # BLD AUTO: 6.02 X10(3) UL (ref 1.5–7.7)
NEUTROPHILS NFR BLD AUTO: 64.6 %
OSMOLALITY SERPL CALC.SUM OF ELEC: 274 MOSM/KG (ref 275–295)
PLATELET # BLD AUTO: 268 10(3)UL (ref 150–450)
POTASSIUM SERPL-SCNC: 3.1 MMOL/L (ref 3.5–5.1)
RBC # BLD AUTO: 4.36 X10(6)UL (ref 3.8–5.3)
SODIUM SERPL-SCNC: 133 MMOL/L (ref 136–145)
WBC # BLD AUTO: 9.3 X10(3) UL (ref 4–11)

## 2019-07-19 PROCEDURE — 80053 COMPREHEN METABOLIC PANEL: CPT | Performed by: INTERNAL MEDICINE

## 2019-07-19 PROCEDURE — 85025 COMPLETE CBC W/AUTO DIFF WBC: CPT | Performed by: INTERNAL MEDICINE

## 2019-07-19 RX ORDER — GABAPENTIN 100 MG/1
100 CAPSULE ORAL 2 TIMES DAILY
Status: DISCONTINUED | OUTPATIENT
Start: 2019-07-19 | End: 2019-07-19

## 2019-07-19 RX ORDER — GABAPENTIN 100 MG/1
100 CAPSULE ORAL 2 TIMES DAILY
Qty: 60 CAPSULE | Refills: 1 | Status: SHIPPED | OUTPATIENT
Start: 2019-07-19

## 2019-07-19 RX ORDER — POTASSIUM CHLORIDE 1.5 G/1.77G
40 POWDER, FOR SOLUTION ORAL EVERY 4 HOURS
Status: DISCONTINUED | OUTPATIENT
Start: 2019-07-19 | End: 2019-07-19

## 2019-07-19 RX ORDER — POTASSIUM CHLORIDE 20 MEQ/1
40 TABLET, EXTENDED RELEASE ORAL EVERY 4 HOURS
Status: DISCONTINUED | OUTPATIENT
Start: 2019-07-19 | End: 2019-07-19

## 2019-07-19 NOTE — PROGRESS NOTES
NURSING DISCHARGE NOTE    Discharged Nursing home via Ambulance. Accompanied by Family member  Belongings Taken by patient/family. IV removed. Report called to [address] at [facility]. All questions and concerns answered.  Printed scripts for gabapentin

## 2019-07-19 NOTE — PROGRESS NOTES
[facility]                INFECTIOUS DISEASE PROGRESS NOTE    Peter Jacobo Patient Status:  Inpatient    1920 MRN [id number]   [facility] 3NE-A Attending Itz Farrell MD   Hosp Day # 2 PCP Amanda Lopez MD       Subject encounter     Laceration of right ear lobe     Hypotension     Urinary tract infection without hematuria     Hip fracture requiring operative repair, right, closed, initial encounter ([facility] [address]     Anemia     Acute pain of right shoulder     Disseminated herpes

## 2019-07-19 NOTE — DISCHARGE SUMMARY
[facility]  Discharge Summary    Oly Morales Patient Status:  Inpatient    1920 MRN [id number]   [facility] 3NE-A Attending Bethany Madrigal MD   Hosp Day # 2 PCP Jhonny Poole MD     Date of Admission: 2019    Date of Dis mg total) by mouth daily for 3 days. Qty: 3 tablet Refills: 0    !! predniSONE 5 MG Oral Tab  Take 1 tablet (5 mg total) by mouth daily with breakfast for 3 doses. Qty: 3 tablet Refills: 0    !! - Potential duplicate medications found.  Please discuss wit week. Every MONDAY    docusate sodium 100 MG Oral Cap  Take 100 mg by mouth 2 (two) times daily as needed for constipation. aspirin-acetaminophen-caffeine (PAIN RELIEVER PLUS) 250-250-65 MG Oral Tab  Take 2 tablets by mouth daily as needed.             Emerson Jensen

## 2019-07-19 NOTE — OCCUPATIONAL THERAPY NOTE
Attempted to see pt for skilled OT services this date. Pt is currently occupied with nursing then eating afterward. Will re-attempt as schedule allows.  Chris Candelario, 07/19/19, 10:08 AM

## 2019-07-19 NOTE — PROGRESS NOTES
A/O x 2-3  Per I/D contact and airborne precautions OK to be DCd- shingles not disseminated  Takes pills 1 at a time whole with water  Eye ointment to R eye  Upper and lower dentures  Room air  Electrolyte replacement per protocol  Ensure clear TID with me

## 2019-07-23 NOTE — PAYOR COMM NOTE
--------------  DISCHARGE REVIEW    Payor: [employer] MEDICARE ADV PPO  Subscriber #:  [id number]  Authorization Number: [id number]    Admit date: 7/17/19  Admit time:  2031  Discharge Date: 7/19/2019  1:42 PM     Admitting Physician: MD Crys Reza herpes zoster      Reason for Admission: hsv infection    Physical Exam: see progress sheet    Hospital Course: iv acylovir later switched to oral seen by honorio lipscomb today on orals add gabapentin for post herpetic neurlagia     Consultations:   please refer to needed for Pain (2 TABS). Carboxymethylcellulose Sodium (REFRESH OP)  Place 1 drop into both eyes daily. erythromycin 5 MG/GM Ophthalmic Ointment  Place 1 Application into the right eye 2 (two) times daily for 10 days.   Qty: 1 Tube Refills: 1  As

## 2021-02-09 NOTE — ED NOTES
Patient is resting comfortably. MD at bedside to update patient and family at bedside. Will attempt to call [facility] to determine if they are capable of managing patient there. [General Appearance - Alert] : alert [General Appearance - In No Acute Distress] : in no acute distress [Sclera] : the sclera and conjunctiva were normal [Neck Appearance] : the appearance of the neck was normal [] : no respiratory distress [Exaggerated Use Of Accessory Muscles For Inspiration] : no accessory muscle use [Heart Rate And Rhythm] : heart rate was normal and rhythm regular [Bowel Sounds] : normal bowel sounds [Abdomen Soft] : soft [Abdomen Tenderness] : non-tender [No Focal Deficits] : no focal deficits [Oriented To Time, Place, And Person] : oriented to person, place, and time [FreeTextEntry1] : thin

## 2021-02-22 NOTE — PLAN OF CARE
PAIN - ADULT    • Verbalizes/displays adequate comfort level or patient's stated pain goal Progressing        SAFETY ADULT - FALL    • Free from fall injury Progressing          Pt doing well. Up in chair this AM with PT.   Bruising noted to BELIAE, very tende Problem: Anger Management/Homicidal Ideation:  Goal: Absence of angry outbursts  Description: Absence of angry outbursts  Note: No angry outbursts this shift. Problem: Altered Mood, Depressive Behavior:  Goal: Participates in care planning  Description: Participates in care planning  Outcome: Not Met This Shift  Note: Care plan reviewed with patient. Patient does verbalize understanding of the plan of care and does not contribute to goal setting . Problem: Altered Mood, Psychotic Behavior:  Goal: Able to verbalize reality based thinking  Description: Able to verbalize reality based thinking  Outcome: Met This Shift  Note: Patient is oriented to 4 spheres. Patient reports that she is going home in the morning. Problem: Discharge Planning:  Goal: Discharged to appropriate level of care  Description: Discharged to appropriate level of care  Outcome: Ongoing  Note: Discharge planning is in process. Problem: Role Relationship:  Goal: Ability to demonstrate positive changes in social behaviors and relationships will improve  Description: Ability to demonstrate positive changes in social behaviors and relationships will improve  Outcome: Ongoing     Problem: Anxiety:  Goal: Level of anxiety will decrease  Description: Level of anxiety will decrease  Outcome: Ongoing  Note: Patient reports that she has \"some \" anxiety. Scheduled Xanax given at bedtime. Problem: Falls - Risk of:  Goal: Will remain free from falls  Description: Will remain free from falls  Outcome: Met This Shift  Note: Patient remains free from falls this shift. Goal: Absence of physical injury  Description: Absence of physical injury  Outcome: Met This Shift  Note: Patient remains free from physical injury this shift.      Problem: General Medical Problem-Behavioral Health  Goal: Compliance with prescribed medication regimen will improve  Description: Compliance with prescribed medication regimen will improve  Outcome: Met This Shift Note: Patient takes her medications as prescribed.

## 2022-04-11 NOTE — DIETARY NOTE
[address]     Admitting diagnosis:  Disseminated herpes zoster [B02.7]    Ht: 152.4 cm (5')  Wt: 40.7 kg (89 lb 12.8 oz). This is 89 % of IBW  Body mass index is 17.54 kg/m².   IBW: 45.5kg    Labs/Meds reviewed Encounter Date: 4/11/12/7/2018    Procedure: COLONOSCOPY;  Surgeon: Moy Rodriguez MD;  Location: [facility] ENDO;  Service: Endoscopy;  Laterality: N/A;    COLONOSCOPY N/A 12/10/2021    Procedure: COLONOSCOPY;  Surgeon: Moy Rodriguez MD;  Location: [facility] ENDO;  [id number]                               [address] [place] [address] DR  [address]  [place] [address]  [phone]             Francisco Wei MD  04/11/22 5554

## 2025-02-26 NOTE — CM/SW NOTE
10:45am  MSW spoke to Michael from [place], they will take patient back today, agreered to 1pm d/c. MSW spoke to Rn who is agreeable to d/c.     Discharge Plan Status:    Projected Destination: Long term nursing home  Current Barriers to d/c: Medical Cleara
Pt is known to writer, from [facility].  MSW called Admissions and sent updated Clinicals to see if/when patient can return with Shingles.'    MSW called back by [address] must d/c'd  Before d/c to nursing home  Nursing home staff to do
26-Feb-2025 11:00

## (undated) DEVICE — LAPAROTOMY SPONGE - RF AND X-RAY DETECTABLE PRE-WASHED: Brand: SITUATE

## (undated) DEVICE — KENDALL SCD EXPRESS SLEEVES, KNEE LENGTH, MEDIUM: Brand: KENDALL SCD

## (undated) DEVICE — Device: Brand: STABLECUT®

## (undated) DEVICE — STERILE POLYISOPRENE POWDER-FREE SURGICAL GLOVES: Brand: PROTEXIS

## (undated) DEVICE — GOWN SURG AERO CHROME XXL

## (undated) DEVICE — SOL  .9 1000ML BTL

## (undated) DEVICE — PAD SACRAL SPAN AID

## (undated) DEVICE — SOL  .9 3000ML

## (undated) DEVICE — ZIPWIRE GUIDEWIRE .035X150 STR

## (undated) DEVICE — OPEN-END FLEXI-TIP URETERAL CATHETER: Brand: FLEXI-TIP

## (undated) DEVICE — VISUALIZATION SYSTEM: Brand: CLEARIFY

## (undated) DEVICE — Device

## (undated) DEVICE — 3M™ COBAN™ NL STERILE NON-LATEX SELF-ADHERENT WRAP, 2084S, 4 IN X 5 YD (10 CM X 4,5 M), 18 ROLLS/CASE: Brand: 3M™ COBAN™

## (undated) DEVICE — DRAPE C-ARM UNIVERSAL

## (undated) DEVICE — OCCLUSIVE GAUZE STRIP OVERWRAP,3% BISMUTH TRIBROMOPHENATE IN PETROLATUM BLEND: Brand: XEROFORM

## (undated) DEVICE — ENDOPATH XCEL UNIVERSAL TROCAR STABLILITY SLEEVES: Brand: ENDOPATH XCEL

## (undated) DEVICE — MONOFILAMENT ABSORBABLE SUTURE: Brand: MAXON

## (undated) DEVICE — ENDOPATH XCEL DILATING TIP TROCARS WITH STABILITY SLEEVES: Brand: ENDOPATH XCEL

## (undated) DEVICE — SHEET,DRAPE,70X100,STERILE: Brand: MEDLINE

## (undated) DEVICE — STOCKINETTE HYDROMED 6\" 706044

## (undated) DEVICE — SUTURE VICRYL 2-0 FSL

## (undated) DEVICE — ENDOPATH XCEL WITH OPTIVIEW TECHNOLOGY BLADELESS TROCARS WITH STABILITY SLEEVES: Brand: ENDOPATH XCEL OPTIVIEW

## (undated) DEVICE — SUTURE VICRYL 0 CP-2

## (undated) DEVICE — SUTURE MONOCRYL 4-0 PS-2

## (undated) DEVICE — SUTURE ETHIBOND 1 OS-6

## (undated) DEVICE — LAP CHOLE/APPY CDS-LF: Brand: MEDLINE INDUSTRIES, INC.

## (undated) DEVICE — LIGAMAX 5 MM ENDOSCOPIC MULTIPLE CLIP APPLIER: Brand: LIGAMAX

## (undated) DEVICE — COVER,MAYO STAND,STERILE: Brand: MEDLINE

## (undated) DEVICE — GLOVE SURG SENSICARE SZ 6-1/2

## (undated) DEVICE — TOTAL HIP CDS: Brand: MEDLINE INDUSTRIES, INC.

## (undated) DEVICE — ABSORBABLE HEMOSTAT (OXIDIZED REGENERATED CELLULOSE, U.S.P.): Brand: SURGICEL

## (undated) DEVICE — 1010 S-DRAPE TOWEL DRAPE 10/BX: Brand: STERI-DRAPE™

## (undated) NOTE — ED AVS SNAPSHOT
Madhuri Brooks   MRN: [id number]    Department:  [facility] Emergency Department   Date of Visit:  7/30/2017           Disclosure     Insurance plans vary and the physician(s) referred by the ER may not be covered by your plan.  Please contact your If you have been prescribed any medication(s), please fill your prescription right away and begin taking the medication(s) as directed    If the emergency physician has read X-rays, these will be re-interpreted by a radiologist.  If there is a significant

## (undated) NOTE — IP AVS SNAPSHOT
Patient Demographics     Address Phone    Audra Louis [phone] [facility])  [phone] [facility]      Emergency Contact(s)     Name Relation Home Work Fabian Granados [phone]    Maribel Christina Take 1 tablet by mouth daily as needed. Indications: Generalized Ache                            lisinopril 10 MG Tabs   Commonly known as:  PRINIVIL,ZESTRIL   Next dose due:  3/7        Take 10 mg by mouth daily.  Indications: High Blood Pressure Order ID Medication Name Action Time Action Reason Comments    [id number] Enoxaparin Sodium (LOVENOX) 30 MG/0.3ML injection 30 mg 03/06/17 0837 Given                    Recent Vital Signs       Most Recent Value    Vitals 129/80 mmHg Filed at 03/06/2017 13 Value Reference Range [facility]   WBC 7.8 4.0-13.0 x10(3) uL  [facility]   RBC 3.65 3.80-5.10 x10(6)uL [facility]   HGB 11.1 12.0-16.0 g/dL [facility]   HCT 33.0 34.0-50.0 % [facility]   .0 150.0-450.0 10(3)uL  [facility]   MCV 90.4 81.0-100. 0 Chief Complaint: Abdominal pain    History of Present Illness:  Екатерина Rodríguez is a 80year old female admitted with epigastric and right upper quadrant abdominal pain. Started today early a.m. Pain woke her up from sleep according to patient.   Patient s Indications: High Blood Pressure Disp:  Rfl:  3/1/2017 at Unknown time   aspirin-acetaminophen-caffeine (PAIN RELIEVER PLUS) 250-250-65 MG Oral Tab Take 1 tablet by mouth daily as needed.  Indications: Generalized Ache Disp:  Rfl:  3/1/2017 at Unknown time TROP  <0.046     Imaging:  CT ANGIOGRAM OF THE CHEST, ABDOMEN, AND PELVIS WITH CONTRAST      COMPARISON:  RODDY , CT SPINE CERVICAL (CPT=72125), 11/24/2016, 15:56.  RODDY , CT ANGIOGRAPHY, CHEST (CPT=71275), 2/22/2014, 21:29.  EDALEXSANDRA , CT ABDOMEN/PELVIS L1 compression deformity is stable. T10 compression deformity has progressed with vertebral plana. T8 vertebral plana compression deformity is stable. There is a new interval T3 marked vertebral plana compression deformity.                       FINDINGS: This critical result was discussed with Dr. Roddy Dance at 8:48 AM on March 2, 2017. Read back was performed.  Aurther Miss  Dictated by: lIsa Nolen MD on 3/02/2017 at 8:34          ASSESSMENT / PLAN:     1.  Acute gallstone pancreatitis,cholelithiasis with pos 1. IP Consult to Gastroenterology Once [[id number]] ordered by Aminata Vazquez MD at 03/02/17 1103     2.  IP Consult to Gastroenterology Once [[id number]] ordered by Anahy Nur DO at 03/02/17 29 Beatriz Moeller PMHx:   Past Medical History   Diagnosis Date   • Unspecified essential hypertension    • Migraines    • Cardiac anomaly    • Osteoarthritis    • Aneurysm of aorta (HCC)        PSHx:     Past Surgical History    APPENDECTOMY      TONSILLECTOMY          Med 3.375 g Intravenous Once       Allergies: No Known Allergies    SocHx:    Smoking status: Never Smoker    Alcohol Use: Social, less than 1 beer/week    Drug Use: Not on file     FamHx: The patient has no family history of colon cancer or other gastrointest Resp: Clear to auscultation bilaterally without wheezes; rubs, rhonchi, or rales    Abdomen: Soft, moderately tender at the RUQ and epigastrium, protuberant lower abdomen; active bowel sounds; No hepatosplenomegaly; no rebound or guarding;  No ascites is cl of vascular anatomy.  Dose reduction   techniques were used.  Dose information is transmitted to the ACR ([facility]) Laxmi Morales 35 ([address]) which includes the Dose Index Registry.     PATIENT STATED HISTORY:  Patient com definite obstructing lesion is not identified in the pancreatic head or duodenum. Mild intrahepatic biliary ductal dilatation is noted. Periportal fluid tracking is also identified.   1 cm cyst along with adjacent 4 mm cyst in the upper pole the right kidne Large hiatal hernia is stable.     Cardiac silhouette and pulmonary vasculature are unremarkable.     No consolidation, pleural effusion or pneumothorax.      Impression:     CONCLUSION:  No change since prior exam.           Dictated by: Elizabeth Roberts, cholecystitis. On exam, guarding and rebound presesnt. The LUQ seems just as bad as RUQ. If pain continues, will need to consider repeat CT scan to assess.     Momo Ribeiro MD  [facility]enter[facility]     Electronically signed by Kayy Serna Static Sitting: Fair  Dynamic Sitting: Fair           Static Standing: Fair -  Dynamic Standing: Poor +    ACTIVITY TOLERANCE  Room air  No shortness of breath    AM-PAC '6-Clicks' INPATIENT SHORT FORM - BASI Pt returned back to room - verbal cues to avoid obstacles in pathway - Pt eager for arrival of breakfast tray. Spoke with RN and Pt's family regarding safe transition to home.   Pt currently has 5 hours/5 days a week of caregiver services, otherwise Mod I Goal #4  Patient will be able to tolerate a standardized assessment within 3 visits.      Goal #5      Goal #6      Goal Comments: Goals established on 3/3/2017 - all goals ongoing as of 3/5/2017                     Occupational Therapy Notes (last 72 hours Lives With: Caregiver part-time (5 days/wk, 5hrs/day)    Toilet and Equipment: Toilet riser with arms  Shower/Tub and Equipment: Tub-shower combo; Shower chair  Other Equipment: Other (Comment) (flor,  PHILIP )    Occupation/Status: N/A  Hand Dominance: Right -   Putting on and taking off regular upper body clothing?: A Little  -   Taking care of personal grooming such as brushing teeth?: A Little  -   Eating meals?: None    AM-PAC Score:  Score: 16  Approx Degree of Impairment: 53.32%  Standardized Score (AM-P above deficits, maximizing patient's ability to return to prior level of function. Subacute rehab is recommended for 14-17 days. After this period of rehabilitation patient should achieve supervision level in basic ADL.  Pt may require a more supportive

## (undated) NOTE — LETTER
[facility]  Henny Lu 61 [address] [facility], [address] [place] [address]    Consent for Operation    Date: __________________    Time: _______________    1.  I authorize the performance upon Velma Haskins the following operation:    Procedure(s):  Right hip hemiarthrop procedure has been videotaped, the surgeon will obtain the original videotape. The hospital will not be responsible for storage or maintenance of this tape.     6. For the purpose of advancing medical education, I consent to the admittance of observers to t STATEMENTS REQUIRING INSERTION OR COMPLETION WERE FILLED IN.     Signature of Patient:   ___________________________    When the patient is a minor or mentally incompetent to give consent:  Signature of person authorized to consent for patient: ____________ drugs/illegal medications). Failure to inform my anesthesiologist about these medicines may increase my risk of anesthetic complications. · If I am allergic to anything or have had a reaction to anesthesia before.     3. I understand how the anesthesia med I have discussed the procedure and information above with the patient (or patient’s representative) and answered their questions. The patient or their representative has agreed to have anesthesia services.     _______________________________________________

## (undated) NOTE — IP AVS SNAPSHOT
[facility] [place] [address] [place], [address] ~ [phone]                Discharge Summary   3/2/2017    Екатерина Rodríguez           Admission Information        Provider Department    3/2/2017 Sofya Hernandez MD  [id number]         Than Commonly known as:  ULTRAM   Next dose due:  3/6 at 1600        Take 1 tablet (50 mg total) by mouth 3 (three) times daily.     Kelsey Gaines                                      Where to Get Your Medications      Please  your prescriptions at the lo 170.0       Recent Hematology Lab Results (cont.)  (Last 3 results in the past 90 days)    Neutrophil % Lymphocyte % Monocyte % Eosinophil % Basophil % Prelim Neut Abs Final Neut Abs Lymphocyte Abso Monocyte Absolu Eosinophil Abso Basophil Absolu    (03/04 - If you don’t have insurance, Felecia Grimes has partnered with [facility] to help you get signed up for insurance coverage.   [facility] is a Federal Navigator program that can help with your Affordable Care Act cover Amoxicillin-Pot Clavulanate 875-125 MG Oral Tab         Use: Treat infections or suspected infection   Most common side effects:  Allergic reactions, rash, nausea, diarrhea   What to report to your healthcare team: Tolerance of medications, temperature, ra

## (undated) NOTE — Clinical Note
2017    Patient: Stephanie Vanessa  : 1920 Visit date: 3/16/2017    Dear  Dr. Kamini Paul MD,    Thank you for referring Stephanie Vanessa to my practice. Please find my assessment and plan below.            Assessment   Calculus of gallbladder with ch

## (undated) NOTE — IP AVS SNAPSHOT
Patient Demographics     Address  [address] [place] [address] Phone  [phone] [facility]  [phone] [facility]      Emergency Contact(s)     Name Relation Home Work Fabian Granados [phone]    Marylen Shorter ? Usually allowed after four to six weeks. Discuss specific work activities with your surgeon. Restrictions  ? For hip replacement surgery, follow instructions provided by physical therapy    No smoking  ? Avoid smoking.  It is known to cause breathing ? Review anticoagulant education information sheet provided. Discomfort  ? Surgical discomfort is normal for one to two months. ? Have realistic goals and keep a positive outlook. ?  Keep pain manageable; pain should not disrupt your sleep or activitie hand  as soon as they walk in your house-whether they live there or are visiting. ? Keep bed linen/clothing freshly laundered. ? Do not allow others or pets to touch your incision. ? Avoid people that have colds or the flu. ?  Your surgeon FLACO ? Increased severe leg or groin pain  ? Turning in or out of surgical leg that is new  ? Increased numbness, tingling to leg  ? Inability to walk or put weight on your surgical leg      Signs of blood clot  ?  Pain, excessive tenderness, redness, or swellin Immobilizer to RUE  Max assist for transfers.     No hip flexion >90 degrees, no leg crossing, no inward rotation of the affected leg.                          Follow-up Information     Schedule an appointment as soon as possible for a visit with Janny Donovan, Chew 1 tablet by mouth every 6 (six) hours as needed for Heartburn. docusate sodium 100 MG Caps  Commonly known as:  COLACE  Next dose due:  9P      Take 100 mg by mouth 2 (two) times daily as needed for constipation.           Menthol (Topical An Order ID Medication Name Action Time Action Reason Comments    [id number] calcitonin (salmon) (MIACALCIN) nasal solution 1 spray 02/22/18 0758 Given                    Recent Vital Signs    Flowsheet Row Most Recent Value   Vitals  121/68 Filed at 02/22/20 [facility]  History and Physical     Belkis Person Patient Status:  Emergency    1920 MRN [id number]   Location [address] Attending Eloy Andino MD   Hosp Day # 0 PCP Shari Gonzalez MD     Chief Complaint:fall acetaminophen 325 MG Oral Tab Take 650 mg by mouth every 4 (four) hours as needed for Pain (2 TABS). Disp:  Rfl:    Carboxymethylcellulose Sodium (REFRESH OP) Place 1 drop into both eyes daily.    Disp:  Rfl:    lisinopril (PRINIVIL,ZESTRIL) 10 MG Oral Ta No results for input(s): TROP, CK in the last 72 hours. Imaging: Imaging data reviewed in Epic. ASSESSMENT / PLAN:     1. S/p mechanical fall with right humeral fracture   1.  Pain control with toradol as she is not tolerating norco due to hypotensi fracture treated with a sling.   Had tried to get up and could not, ultimately an xray was performed and a right femoral neck fracture was noted  Pain 5/10, throb, worse with activity    History:  Past Medical History:   Diagnosis Date   • Cardiac anomaly °C), temperature source Oral, resp. rate 18, weight 94 lb 2.2 oz (42.7 kg), SpO2 94 %.   RLE  + TTP right hip with IR and ER  Comp soft  No pain with passive stretch  NVI  Pulses intact  Laboratory Data:  xrays show displaced right femoral neck [facility] [address] [facility] N[facility]  2/18/2018  7:52 AM[SA.1]      Electronically signed by Jerman Steen MD on 2/18/2018  7:54 AM   Attribution Bean    SA. 1 - Jerman Steen MD on 2/18/2018  7:52 AM                     D/C Summary     No notes of this typ No date: APPENDECTOMY  3/22/17: LAPAROSCOPIC CHOLECYSTECTOMY  No date: TONSILLECTOMY[BR.2]    SUBJECTIVE  \" I am in so much pain, do I really have to get up now. \"    Patient’s self-stated goal is to go home.     OBJECTIVE[BR.1]  Precautions: MATTIE - posteri Skilled Therapy Provided: Pt required max assist of one for supine to sit due to inability to use R arm, inability to move RLE without assist and dec trunk strength.  Upon sitting, pt retropulsive,initially mod assist needed to maintain posture in midline b Goal #1 Patient is able to demonstrate supine - sit EOB @ level: moderate assistance   Goal #2 Patient is able to demonstrate transfers Sit to/from Stand at assistance level: moderate assistance   Goal #3 Assess gait with use of hemiwalker or quad cane as History related to current admission: Patient is 80years old female fell @ [facility]. Sustained right humeral Fx. Being treated conservative - with immobilizer. Pt presents for re-evaluation as pt is status post R hemiarthroplasty on 2/18/18.  Pt Location: R UE and R LE  Management Techniques:  Activity promotion    COGNITION  · Overall Cognitive Status:  WFL - within functional limits    RANGE OF MOTION AND STRENGTH ASSESSMENT  Upper extremity ROM and strength are within functional limits except fo Skilled Therapy Provided: Pt lying in bed and agreed to PT. Pt requested to use commode ASAP. Pt educated on WBAT and posterior hip precautions. Pt assisted with Mod A x 2 for supine<>sit. Pt cued throughout mobility for posterior hip precautions.  Pt with overall the evaluation complexity is considered low. These impairments and comorbidities manifest themselves as functional limitations in independent bed mobility, transfers. Unable to ambulate without RW, needs assessment for use of hemiwalker vs quad can pinning hemiarthroplasty/bipolar 2/18/18    History related to current admission: Patient is 80year old female admitted on 2/14/2018 from [facility] following a fall, initially found to have R proximal humerus fracture being treated conservativel How much help from another person does the patient currently need…  -   Putting on and taking off regular lower body clothing?: Total  -   Bathing (including washing, rinsing, drying)?: Total  -   Toileting, which includes using toilet, bedpan or urinal? : management with good verbal understanding. Increased time required for all tasks due to fatigue and increased pain with movement. Patient End of Session: In bed; With [facility] staff;Needs met;Call light within reach;RN aware of session/findings; All patient quest 10:44 AM  Version 1 of 1    Author:  Mari Orourke OT Service:  Rehab Author Type:  Occupational Therapist    Filed:  2/20/2018 10:44 AM Date of Service:  2/20/2018 10:30 AM Status:  Signed    :  Mari Orourke OT (Occupational Therapist) Closed fracture of neck of right humerus    Laceration of right ear lobe    Hypotension    Urinary tract infection without hematuria    Hip fracture requiring operative repair, right, closed, initial encounter ([facility] [address]    Anemia[BW.2]      Past Medical Hist techniques;Relaxation;Repositioning[BW.2]    COGNITION  Overall Cognitive Status:  WFL - within functional limits    Communication: WFL    Behavioral/Emotional/Social: WFL    RANGE OF MOTION AND STRENGTH ASSESSMENT  Upper extremity ROM is within functional immobilizer management, and watching for skin breakdown with fair verbal understanding, will benefit from reinforcement; education on bed mobility, performed supine to sit with max assist x2 and cues for NWB RUE; sitting EOB several minutes with poor plus the following performance deficits: increased pain, decreased balance, decreased endurance, decreased knowledge of hip precautions and incorporation into ADLs, decreased knowledge of adaptive techniques.  These deficits impact the patient’s ability to parti OT Treatment Plan: Balance activities; ADL training;Functional transfer training; Endurance training;Patient/Family education;Patient/Family training; Compensatory technique education  Rehab Potential : Guarded  Frequency (Obs): 5x/week  Number of Visits to Emmanuel Manley

## (undated) NOTE — IP AVS SNAPSHOT
[address]            (For Outpatient Use Only) Initial Admit Date: 7/17/2019   Inpt/Obs Admit Date: Inpt: 7/17/19 / Obs: N/A   Discharge Date:    Faith Pyle:  [de-identified]   MRN: [de-identified]   CSN: [id number]   CEID: [id number] Hospital Account Financial Class: Medicare Advantage    July 19, 2019

## (undated) NOTE — ED AVS SNAPSHOT
Gregory Lamb   MRN: [id number]    Department:  [facility] Emergency Department   Date of Visit:  7/10/2019           Disclosure     Insurance plans vary and the physician(s) referred by the ER may not be covered by your plan.  Please contact your tell this physician (or your personal doctor if your instructions are to return to your personal doctor) about any new or lasting problems. The primary care or specialist physician will see patients referred from the [facility] Emergency Department.  Salvadore Skiff

## (undated) NOTE — IP AVS SNAPSHOT
[facility] [place] [address] [place] [address], [address] [place] [address] Rd ~ [phone]                Discharge Summary   3/22/2017    Velma Haskins           Admission Information        Provider Department    3/22/2017 Fran Parker MD  Pre-Op / Asc Chew 1 tablet by mouth every 6 (six) hours as needed for Heartburn.       [    ]    [    ]    [    ]    [    ]       furosemide 20 MG Tabs   Commonly known as:  LASIX           [    ]    [    ]    [    ]    [    ]       ibuprofen 400 MG Tabs   Commonly kno coumadin. All other home medications may be resumed as scheduled. With severe appendicitis antibiotics will also be prescribed. With antibiotics, please watch for rash, facial swelling or severe diarrhea.     DIET  The patient may resume a general diet imme patient should be up and around the house. The patient should not lie in bed and should not stay in [facility]. We count on the patient being up, coughing, walking and deep breathing to avoid pneumonia and blood clots in the legs.  Once a day the patient shoul · Do Foot pumps when you are sitting  · This can prevent blood clots  3.  Drink [employer]  · Sometimes after surgery; you don't feel like eating normally  · If you don't drink water; you could get dehydrated    Alcoholic beverages should be avoided fo No immunizations on file.       Recent Hematology Lab Results  (Last 3 results in the past 90 days)    WBC RBC Hemoglobin Hematocrit MCV MCH MCHC RDW Platelet MPV    (25/49/88)  7.8 (03/06/17)  3.65 (L) (03/06/17)  11.1 (L) (03/06/17)  33.0 (L) (03/06/17) - If you are a smoker or have smoked in the last 12 months, we encourage you to explore options for quitting.     - If you have concerns related to behavioral health issues or thoughts of harming yourself, contact [facility] a

## (undated) NOTE — IP AVS SNAPSHOT
Patient Demographics     Address  [address] [place] [address]  Jamari Dimas [address] Phone  [phone] [facility]  [phone] [facility]      Emergency Contact(s)     Name Relation Home Work Fabian Granados [phone]    Christina Butts [address] [facility] [address] Application into the right eye 2 (two) times daily for 10 days. Stop taking on:  7/21/2019   MADINA GAN, OD         gabapentin 100 MG Caps  Commonly known as:  NEURONTIN      Take 1 capsule (100 mg total) by mouth 2 (two) times daily.    Lula Lee [id number] - MAR ACTION REPORT  (last 24 hrs)    ** SITE UNKNOWN **     Order ID Medication Name Action Time Action Reason Comments    [id number] Pantoprazole Sodium (PROTONIX) EC tab 20 mg 07/19/19 0528 Given      [id number] acyclovir (ZOVIRAX) ta Glucose 112 70 - 99 mg/dL [facility]   Sodium 133 136 - 145 mmol/L  [facility]   Potassium 3.1 3.5 - 5.1 mmol/L  [facility]   Chloride 98 98 - 112 mmol/L — [facility]   CO2 30.0 21.0 - 32.0 mmol/L — [facility]   Anion Gap 5 0 - 18 mmol/L — [facility]   B H&P filed by Holli Obrien MD at 7/18/2019 10:58 AM / Draft: Not Electronically Signed  Version 1 of 1    Author:  Holli Obrien MD Service:  Internal Medicine Author Type:  Physician    Filed:  7/18/2019 10:58 AM Status:  Unsigned Transcription    Micheal GENERAL:  Condition fair, awake, alert. HEENT:  Head:  Atraumatic. Eyes:  EOMI. NECK:  Supple. No JVD. LUNGS:  Clear to auscultation. No rhonchi or wheeze. HEART:  Regular rhythm. No S3.  ABDOMEN:  Bowel sounds present. Soft, nontender.   EXTREMI Zoster, with 2nd dissemination    History of Present Illness:  Calleen Dancer is a a(n) 80year old female dx with shingles right hand, and had spread to trunk, had biopsy confirming VZV infection.  She has been treated with acyclovir for the last week, and •  [START ON 7/23/2019] predniSONE (DELTASONE) tab 5 mg, 5 mg, Oral, Daily with breakfast  •  Acyclovir Sodium (ZOVIRAX) 400 mg in sodium chloride 0.9% 100 mL IVPB, 400 mg, Intravenous, Q12H  •  0.9% NaCl infusion, , Intravenous, Continuous  •  PEG 3350 (M acetaminophen 325 MG Oral Tab Take 650 mg by mouth every 4 (four) hours as needed for Pain (2 TABS). Disp:  Rfl:    Carboxymethylcellulose Sodium (REFRESH OP) Place 1 drop into both eyes daily.    Disp:  Rfl:    erythromycin 5 MG/GM Ophthalmic Ointment Pl HGB 12.8   HCT 37.5   MCV 89.7   MCH 30.6   MCHC 34.1   RDW 13.2   NEPRELIM 5.05   WBC 8.0   .0       Recent Labs   Lab 07/17/19  1808   GLU 95   BUN 8   CREATSERUM 0.51*   GFRAA 93   GFRNAA 80   CA 8.4*   ALB 2.7*   *   K 4.0   CL 97*   CO2 2 :   Nikki Swain MD (Physician)       [facility]  Discharge Summary    Andre Barkley Patient Status:  Inpatient    1920 MRN [id number]   [facility] 3NE-A Attending Nicole Corona MD   Hosp Day # 2 PCP Bud Paige MD !! predniSONE 10 MG Oral Tab  Take 1 tablet (10 mg total) by mouth daily for 3 days. Qty: 3 tablet Refills: 0    !! predniSONE 5 MG Oral Tab  Take 1 tablet (5 mg total) by mouth daily with breakfast for 3 doses.   Qty: 3 tablet Refills: 0    !! - Potential Alendronate Sodium 70 MG Oral Tab  Take 70 mg by mouth once a week. Every MONDAY    docusate sodium 100 MG Oral Cap  Take 100 mg by mouth 2 (two) times daily as needed for constipation.     aspirin-acetaminophen-caffeine (PAIN RELIEVER PLUS) 250-250-65 MG O • HIP HEMIARTHROPLASTY/ BIPOLAR Right 2/18/2018    Performed by Selene Gibson MD at [facility] MAIN OR   • LAPAROSCOPIC CHOLECYSTECTOMY  3/22/17   • LAPAROSCOPIC CHOLECYSTECTOMY N/A 3/22/2017    Performed by Carola Abraham MD at [address] [place] [address] Dynamic Sitting: Poor  Static Standing: Poor -  Dynamic Standing: Dependent    ADDITIONAL TESTS                                    NEUROLOGICAL FINDINGS  Neurological Findings: Coordination - Rapid Alternating Movement        Coordination - Rapid Alternati bedside chair. Mod A x 2 for this activity. Pt with retro lean and cued for proper alignment. Pt left up in chair and with all needs in reach. Pt's daughter present during session. RN notified of session.      Exercise/Education Provided:  Bed mobility  Janny Number of Visits to Meet Established Goals: 5      CURRENT GOALS    Goal #1 Patient is able to demonstrate supine - sit EOB @ level: supervision     Goal #2 Patient is able to demonstrate transfers Sit to/from Stand at assistance level: minimum assistance

## (undated) NOTE — IP AVS SNAPSHOT
[address]            (For Outpatient Use Only) Initial Admit Date: 8/5/2017   Inpt/Obs Admit Date: Inpt: 8/5/17 / Obs: 08/05/17   Discharge Date:    Yannap Marcos:  [de-identified]   MRN: [de-identified]   CSN: [id number]        [facility] Hospital Account Financial Class: Medicare Advantage    August 9, 2017

## (undated) NOTE — IP AVS SNAPSHOT
Patient Demographics     Address  [address] [place] [address] Phone  [phone] [facility]  [phone] [facility]      Emergency Contact(s)     Name Relation Home Work Fabian Granados [phone]    Anthony Cyr Apply 1 drop to eye daily. OU    [    ]   [    ]   [    ]   [    ]     TraMADol HCl 50 MG Tabs  Commonly known as:  ULTRAM      Take 1 tablet (50 mg total) by mouth 3 (three) times daily.    Romy Lea MD   [    ]   [    ]   [    ]   [    ] Specimen Information    Type Source Collected On   Urine — 08/09/17 1138          Components    Component Value Reference Range Flag Lab   Na Urine Random 81 mmol/L — [facility]   Comment:    No established reference values for this test.              BAS Blood Culture FREQ X 2 [[id number]] Collected:  08/05/17 1334    Order Status:  Completed Lab Status:  Preliminary result Updated:  08/08/17 1400    Specimen:  Blood from Blood,peripheral      Blood Culture Result No Growth 3 Days         H&P - H&P Note • Unspecified essential hypertension         Past Surgical History: Past Surgical History:  No date: APPENDECTOMY  3/22/17: LAPAROSCOPIC CHOLECYSTECTOMY  No date: TONSILLECTOMY    Social History:  reports that she has never smoked.  She has never used smoke A comprehensive 14 point review of systems was completed. Pertinent positives and negatives noted in the HPI.     Physical Exam:    /75 (BP Location: Left arm)   Pulse 75   Temp 98.6 °F (37 °C) (Oral)   Resp 16   Ht 5' 2.01\" (1.575 m)   Wt 94 lb 3 5. Hypertension-we will continue on lisinopril. 4.   Moderate protein malnutrition- Will have dietician see pt in the am.  5.   Left ulnar styloid fracture- Will continue in arm sling.      Quality:  · DVT Prophylaxis: Heparin   · CODE status: Full  · Fole Patient reportedly slumped to the floor did not hit her head. There is a superficial skin tear on the right elbow. Patient lives normally at home alone however for the last couple weeks they have had a 24-hour caregiver.   Patient's also been more letharg every 6 (six) hours as needed for Heartburn. Disp:  Rfl:    acetaminophen 325 MG Oral Tab Take 325 mg by mouth every 4 (four) hours as needed for Pain (2 TABS). Disp:  Rfl:    Carboxymethylcellulose Sodium (REFRESH OP) Apply 1 drop to eye daily.  OU Disp: CREATSERUM  0.48*   CA  9.2   ALB  3.2*   NA  124*   K  2.9*   CL  83*   CO2  30.0   ALKPHO  73   AST  19   ALT  19   BILT  0.7   TP  6.5       Estimated Creatinine Clearance: 46.2 mL/min (based on SCr of 0.48 mg/dL).     No results for input(s): PTP, INR i :  Tommy Galeano PT (Physical Therapist)           PHYSICAL THERAPY EVALUATION - INPATIENT     Room Number: 3569/0610-M  Evaluation Date: 8/7/2017  Type of Evaluation: Initial[CK.1]  Physician Order: PT Eval and Treat    Presenting Proble and assist for meals, dressing, and bathing for some time now.   However, since fall 2 weeks ago, paid caregiver has increased and family sharing burden of providing nearly round the clock assist (pt occasionally left along for a few hours in the afternoon) ACTIVITY TOLERANCE  O2 Saturation: >92%  Room air  No shortness of breath  Blood Pressure: prior to eval/treat in supine = 151/112; sitting EOB = 177/91; standing = 155/99; return to supine = 670/58    AM-PAC '6-Clicks' INPATIENT SHORT FORM - BASIC MOBILIT Transfer training[CK.1]    Patient End of Session: In bed;Needs met;Call light within reach;RN aware of session/findings; All patient questions and concerns addressed; Family present (dil present, nursing to get Tramadol (pt perseverating on it)[CK.2]    Amon Sandhoff Rehab Potential : Fair  Frequency (Obs): 5x/week  Number of Visits to Meet Established Goals: Jeffery Nuñez. 2]    CURRENT GOALS       Goal #1 Patient is able to demonstrate supine - sit EOB @ level: minimum assistance     Goal #2 Patient is able to demonstrate hoffman • Migraines    • Osteoarthritis    • Unspecified essential hypertension[BB. 2]        Past Surgical History[BB. 1]  Past Surgical History:  No date: APPENDECTOMY  3/22/17: LAPAROSCOPIC CHOLECYSTECTOMY  No date: TONSILLECTOMY[BB. 2]    HOME SITUATION[BB. 1]  Ty Overall Perception Status:   WFL - within functional limits    SENSATION  intact    Communication: Pt was able to communicate effectively throughout session. Perseverating on wanting tramadol.      Behavioral/Emotional/Social: Pt was willing and motivated t assessment completed. Pt completed sit to stand with cues for proper hand placement. Able to maintain balance while standing prior to t/f back to EOB.  Pt t/f to supine with dependent assistance to conclude session.[BB.1] Pt's family voiced no questions at Rehab Potential : Good  Frequency (Obs): Daily  Number of Visits to Meet Established Goals: 5[BB. 2]    ADL Goals  Patient will perform toileting: with mod assist    Functional Transfer Goals  Patient will transfer from sit to supine:  with mod assist  Carey

## (undated) NOTE — Clinical Note
2017    Patient: Peter Jacobo  : 1920 Visit date: 2017    Dear  Dr. Ez Solorzano MD,    Thank you for referring Peter Jacobo to my practice. Please find my assessment and plan below.         Assessment Calculus of gallbladder with acute ch